# Patient Record
Sex: MALE | Race: WHITE | NOT HISPANIC OR LATINO | Employment: FULL TIME | URBAN - METROPOLITAN AREA
[De-identification: names, ages, dates, MRNs, and addresses within clinical notes are randomized per-mention and may not be internally consistent; named-entity substitution may affect disease eponyms.]

---

## 2018-08-30 ENCOUNTER — OFFICE VISIT (OUTPATIENT)
Dept: FAMILY MEDICINE | Facility: CLINIC | Age: 33
End: 2018-08-30
Payer: OTHER MISCELLANEOUS

## 2018-08-30 VITALS
WEIGHT: 266.9 LBS | RESPIRATION RATE: 14 BRPM | HEIGHT: 75 IN | OXYGEN SATURATION: 95 % | DIASTOLIC BLOOD PRESSURE: 104 MMHG | BODY MASS INDEX: 33.18 KG/M2 | TEMPERATURE: 98.5 F | SYSTOLIC BLOOD PRESSURE: 150 MMHG | HEART RATE: 87 BPM

## 2018-08-30 DIAGNOSIS — K42.9 UMBILICAL HERNIA WITHOUT OBSTRUCTION AND WITHOUT GANGRENE: Primary | ICD-10-CM

## 2018-08-30 DIAGNOSIS — R03.0 ELEVATED BLOOD PRESSURE READING WITHOUT DIAGNOSIS OF HYPERTENSION: ICD-10-CM

## 2018-08-30 PROCEDURE — 99203 OFFICE O/P NEW LOW 30 MIN: CPT | Performed by: PHYSICIAN ASSISTANT

## 2018-08-30 NOTE — MR AVS SNAPSHOT
After Visit Summary   8/30/2018    Nikolas Yo    MRN: [id number]           Patient Information     Date Of Birth          1985        Visit Information        Provider Department      8/30/2018 10:20 AM John Hayes PA-C [facility]        Today's Diagnoses     Umbilical hernia without obstruction and without gangrene    -  1    Elevated blood pressure reading without diagnosis of hypertension           Follow-ups after your visit        Additional Services     GENERAL SURG ADULT REFERRAL       Your provider has referred you to: FM[facility]: [facility] [phone]   [email or web address]    Please be aware that coverage of these services is subject to the terms and limitations of your health insurance plan.  Call member services at your health plan with any benefit or coverage questions.      Please bring the following with you to your appointment:    (1) Any X-Rays, CTs or MRIs which have been performed.  Contact the facility where they were done to arrange for  prior to your scheduled appointment.   (2) List of current medications   (3) This referral request   (4) Any documents/labs given to you for this referral                  Follow-up notes from your care team     Return in about 1 month (around 9/30/2018) for BP Recheck.      Who to contact     If you have questions or need follow up information about today's clinic visit or your schedule please contact [facility] directly at [phone].  Normal or non-critical lab and imaging results will be communicated to you by MyChart, letter or phone within 4 business days after the clinic has received the results. If you do not hear from us within 7 days, please contact the clinic through MyChart or phone. If you have a critical or abnormal lab result, we will notify you by phone as soon as possible.  Submit refill requests through  "MyChart or call your pharmacy and they will forward the refill request to us. Please allow 3 business days for your refill to be completed.          Additional Information About Your Visit        Care EveryWhere ID     This is your Care EveryWhere ID. This could be used by other organizations to access your [place] medical records  [id number]        Your Vitals Were     Pulse Temperature Respirations Height Pulse Oximetry BMI (Body Mass Index)    87 98.5  F (36.9  C) (Tympanic) 14 6' 2.5\" (1.892 m) 95% 33.81 kg/m2       Blood Pressure from Last 3 Encounters:   08/30/18 (!) 150/104    Weight from Last 3 Encounters:   08/30/18 266 lb 14.4 oz (121.1 kg)              We Performed the Following     GENERAL SURG ADULT REFERRAL        Primary Care Provider Office Phone # Fax #    [facility] [phone] [address] [facility] [address]        Equal Access to Services     LESLIE EDWARD AH: Hadii aad ku hadasho Soomaali, waaxda luqadaha, qaybta kaalmada adeegyada, waxay idiin hayaan belkys kharash lafabian . So [facility] [phone].    ATENCIÓN: Si habla español, tiene a gillis disposición servicios gratuitos de asistencia lingüística. Llame al [phone].    We comply with applicable federal civil rights laws and [place] laws. We do not discriminate on the basis of race, color, national origin, age, disability, sex, sexual orientation, or gender identity.            Thank you!     Thank you for choosing [facility]  for your care. Our goal is always to provide you with excellent care. Hearing back from our patients is one way we can continue to improve our services. Please take a few minutes to complete the written survey that you may receive in the mail after your visit with us. Thank you!             Your Updated Medication List - Protect others around you: Learn how to safely use, store and throw away your medicines at www.disposemymeds.org.      Notice  As of 8/30/2018 " 11:07 AM    You have not been prescribed any medications.

## 2018-08-30 NOTE — PROGRESS NOTES
SUBJECTIVE:   Nikolas Yo is a 33 year old male who presents to clinic today for the following health issues:    Work Comp - Hernia     Duration: July     Description (location/character/radiation): Patient was moving something at work and lifted something too heavy. Is now experiencing discomfort around belly button area, can sometimes feel protrusion.       Intensity:  moderate    Accompanying signs and symptoms: Intermittent protrusion, discomfort     History (similar episodes/previous evaluation): None    Precipitating or alleviating factors: Wearing a support brace is helpful     Therapies tried and outcome: Support brace around body, Acetaminophen - both are effective      -Patient is a 32yo male who presents with increased abdominal pain sustained in July at work  -He was trying to lift up a heavy cart when he felt some mid abdominal pain  -The sensation was warm, irritated; felt like a bug bit the next day  -Initially seemed to come and go, but is stable  -wearing a stablizing/compression brace      Problem list and histories reviewed & adjusted, as indicated.  Additional history: as documented    There is no problem list on file for this patient.    History reviewed. No pertinent surgical history.    Social History   Substance Use Topics     Smoking status: Current Some Day Smoker     Types: Other     Smokeless tobacco: Current User     Alcohol use Yes     Family History   Problem Relation Age of Onset     Alzheimer Disease Maternal Grandmother            Reviewed and updated as needed this visit by clinical staff  Tobacco  Allergies  Meds  Med Hx  Surg Hx  Fam Hx  Soc Hx      Reviewed and updated as needed this visit by Provider         ROS:  Constitutional, HEENT, cardiovascular, pulmonary, gi and gu systems are negative, except as otherwise noted.    OBJECTIVE:     BP (!) 140/102 (BP Location: Right arm, Patient Position: Chair, Cuff Size: Adult Large)  Pulse 87  Temp 98.5  F (36.9  C)  "(Tympanic)  Resp 14  Ht 6' 2.5\" (1.892 m)  Wt 266 lb 14.4 oz (121.1 kg)  SpO2 95%  BMI 33.81 kg/m2  Body mass index is 33.81 kg/(m^2).  GENERAL: healthy, alert and no distress  ABDOMEN: soft, nontender, bowel sounds normal and hernia: umbilical - increased with valsalva, tender to touch  MS: no gross musculoskeletal defects noted, no edema    Diagnostic Test Results:  none     ASSESSMENT/PLAN:   1. Umbilical hernia without obstruction and without gangrene  This is new and bothersome to patient and sustaining in July while at work. He feels the hernia daily but increased with any physical activity. It is not overly protuberant on exam and he does not have to reduce it often. We'll have him consult with surgery.  - GENERAL SURG ADULT REFERRAL    2. Elevated blood pressure reading without diagnosis of hypertension  Reviewed risks of poor control. He has a lot of risk modifications to work on including weight, possible WALLY, lack of activity and diet. Follow-up in 1 month for recheck.     John Hayes PA-C  [facility]  "

## 2018-08-31 ENCOUNTER — TELEPHONE (OUTPATIENT)
Dept: SURGERY | Facility: CLINIC | Age: 33
End: 2018-08-31

## 2018-08-31 NOTE — TELEPHONE ENCOUNTER
Referral received from John Hayes for umbilical hernia. Appt note states its WC, but no info in chart..    Attempt #1:    Called patient at [phone].  No answer - left message for patient to return call to clinic at [phone].

## 2018-09-18 ENCOUNTER — OFFICE VISIT (OUTPATIENT)
Dept: SURGERY | Facility: CLINIC | Age: 33
End: 2018-09-18
Payer: OTHER MISCELLANEOUS

## 2018-09-18 VITALS
RESPIRATION RATE: 16 BRPM | HEART RATE: 80 BPM | DIASTOLIC BLOOD PRESSURE: 88 MMHG | HEIGHT: 75 IN | BODY MASS INDEX: 32.95 KG/M2 | WEIGHT: 265 LBS | SYSTOLIC BLOOD PRESSURE: 136 MMHG | OXYGEN SATURATION: 97 %

## 2018-09-18 DIAGNOSIS — K42.9 UMBILICAL HERNIA WITHOUT OBSTRUCTION AND WITHOUT GANGRENE: Primary | ICD-10-CM

## 2018-09-18 PROCEDURE — 99204 OFFICE O/P NEW MOD 45 MIN: CPT | Performed by: SURGERY

## 2018-09-18 ASSESSMENT — ENCOUNTER SYMPTOMS: ABDOMINAL PAIN: 1

## 2018-09-18 NOTE — MR AVS SNAPSHOT
"              After Visit Summary   9/18/2018    Nikolas Yo    MRN: [id number]           Patient Information     Date Of Birth          1985        Visit Information        Provider Department      9/18/2018 10:30 AM Alida Garrison MD [facility] Hernia      Today's Diagnoses     Umbilical hernia without obstruction and without gangrene    -  1      Care Instructions    Patient to follow up with Primary Care provider regarding elevated blood pressure.    Ariana Mena CMA            Follow-ups after your visit        Who to contact     If you have questions or need follow up information about today's clinic visit or your schedule please contact [facility] directly at [phone].  Normal or non-critical lab and imaging results will be communicated to you by MyChart, letter or phone within 4 business days after the clinic has received the results. If you do not hear from us within 7 days, please contact the clinic through MyChart or phone. If you have a critical or abnormal lab result, we will notify you by phone as soon as possible.  Submit refill requests through [employer] or call your pharmacy and they will forward the refill request to us. Please allow 3 business days for your refill to be completed.          Additional Information About Your Visit        Care EveryWhere ID     This is your Care EveryWhere ID. This could be used by other organizations to access your [place] medical records  [id number]        Your Vitals Were     Pulse Respirations Height Pulse Oximetry BMI (Body Mass Index)       80 16 6' 2.5\" (1.892 m) 97% 33.57 kg/m2        Blood Pressure from Last 3 Encounters:   09/18/18 136/88   08/30/18 (!) 150/104    Weight from Last 3 Encounters:   09/18/18 265 lb (120.2 kg)   08/30/18 266 lb 14.4 oz (121.1 kg)              Today, you had the following     No orders found for display       Primary Care " Provider Office Phone # Fax #    John Hayes PA-C [phone]       [address] [facility] [address]        Equal Access to Services     LESLIE EDWARD : Hadii aad ku hadbustero Soomaali, waaxda luqadaha, qaybta kaalmada adeegyada, naila waltonn belkys sharma laMarryannabel michael. So [facility] [phone].    ATENCIÓN: Si habla español, tiene a gillis disposición servicios gratuitos de asistencia lingüística. Llame al [phone].    We comply with applicable federal civil rights laws and [place] laws. We do not discriminate on the basis of race, color, national origin, age, disability, sex, sexual orientation, or gender identity.            Thank you!     Thank you for choosing [facility]  for your care. Our goal is always to provide you with excellent care. Hearing back from our patients is one way we can continue to improve our services. Please take a few minutes to complete the written survey that you may receive in the mail after your visit with us. Thank you!             Your Updated Medication List - Protect others around you: Learn how to safely use, store and throw away your medicines at www.disposemymeds.org.      Notice  As of 9/18/2018 10:52 AM    You have not been prescribed any medications.

## 2018-09-18 NOTE — PATIENT INSTRUCTIONS
Patient to follow up with Primary Care provider regarding elevated blood pressure.    Ariana Mena CMA

## 2018-09-18 NOTE — LETTER
2018    Re: Nikolas Yo - 1985    Nikolas Yo is a 33 year old male is seen in consultation for a hernia, at the request of John Hayes PA-C.     Primary umbilical hernia noted after lifting at work.  WC claim submitted.     Plan:    We have discussed observation, reduction techniques and importance, incarceration and strangulation signs, symptoms and importance as well as need to seek emergency treatment.       We have discussed open umbilical hernia repair with mesh in detail, including benefits, alternatives, complications, incision, scar, mesh, infection, anesthesia, bleeding, blood transfusion, DVT, PE, hernia recurrence, lifting and activity limits after surgery.  All questions have been answered to the best of my ability.     He has been given literature to review.   We will schedule surgery when and if the patient elects.     Recommended time off work postop:  2-4 wks  Recommended time off lifting 20 lb:   3 wks, then increase by 10lb weekly for 6 weeks total.     I have encouraged Leydi to stop vaping 1 month prior to surgery as a means to reduce his risk of surgical complications including recurrence and infection.     I have given him a note for work to limit lifting at work to 15 pounds until the date of surgery.      HPI:  Nikolas Yo is a 33 year old male who presents for evaluation of a painful lump in the laquita-umbilical region.       Duration:  2 months  Pain/quality:  Yes / aching/stinging  Inciting event:  Yes - lifting a 50 gallon stock pot onto a cart at work  Exacerbating factors:  lifting at work.   Nausea/vomitting/bloating:  No    Previous surgery in this location:  No     Previous herniorrhaphy:  No      Constipation: Yes.has increased his fiber and water (2 gallons daily at work)  Cough: No  Diabetes: No  Current Smoker: Yes.  vaping daily.     Heavy lifting > 20 lb: Yes - at work.  Now only 15 lb.  Has others do the heavier lifting.      Past  "Medical History:  has no past medical history on file.      Family history reviewed and not pertinent.     ROS:  The 10 point review of systems is negative other than noted in the HPI and above.     PE:    Vitals: /88 (BP Location: Right arm, Cuff Size: Adult Large)  Pulse 80  Resp 16  Ht 6' 2.5\" (1.892 m)  Wt 265 lb (120.2 kg)  SpO2 97%  BMI 33.57 kg/m2  BMI= Body mass index is 33.57 kg/(m^2).  General - Well developed, well nourished male in no apparent distress  HEENT:  Head normocephalic and atraumatic, pupils equal and round, conjunctivae clear, no scleral icterus, mucous membranes moist, external ears and nose normal  Pulm:  Respirations unlabored  Abdomen:  abdomen is obese, soft without significant tenderness, masses, organomegaly or guarding  Hernia:  umbilical, 2 cm, not reduced due to pain, mildly tender  Extremities: Warm without edema  Musculoskeletal:  Normal station and gait  Neurologic: alert, speech is clear, moves all extremities with good strength  Psychiatric: Mood and affect appropriate  Skin: Without lesions or rashes, or karthik Garrison MD    "

## 2018-09-18 NOTE — PROGRESS NOTES
HPI      ROS (Review of Systems):     GASTROINTESTINAL: Positive for abdominal pain.          Physical Exam      Assessment:    Nikolas Yo is a 33 year old male is seen in consultation for a hernia, at the request of John Hayes PA-C.    Primary umbilical hernia noted after lifting at work.  WC claim submitted.    Plan:    We have discussed observation, reduction techniques and importance, incarceration and strangulation signs, symptoms and importance as well as need to seek emergency treatment.      We have discussed open umbilical hernia repair with mesh in detail, including benefits, alternatives, complications, incision, scar, mesh, infection, anesthesia, bleeding, blood transfusion, DVT, PE, hernia recurrence, lifting and activity limits after surgery.  All questions have been answered to the best of my ability.    He has been given literature to review.   We will schedule surgery when and if the patient elects.    Recommended time off work postop:  2-4 wks  Recommended time off lifting 20 lb:   3 wks, then increase by 10lb weekly for 6 weeks total.    I have encouraged Leydi to stop vaping 1 month prior to surgery as a means to reduce his risk of surgical complications including recurrence and infection.    I have given him a note for work to limit lifting at work to 15 pounds until the date of surgery.      HPI:  Nikolas Yo is a 33 year old male who presents for evaluation of a painful lump in the laquita-umbilical region.      Duration:  2 months  Pain/quality:  Yes / aching/stinging  Inciting event:  Yes - lifting a 50 gallon stock pot onto a cart at work  Exacerbating factors:  lifting at work.   Nausea/vomitting/bloating:  No    Previous surgery in this location:  No     Previous herniorrhaphy:  No     Constipation: Yes.has increased his fiber and water (2 gallons daily at work)  Cough: No  Diabetes: No  Current Smoker: Yes.  vaping daily.    Heavy lifting > 20 lb: Yes - at work.   "Now only 15 lb.  Has others do the heavier lifting.     Past Medical History:   has no past medical history on file.    Past Surgical History:  No past surgical history on file.     Social History:  Social History     Social History     Marital status: Single     Spouse name: N/A     Number of children: N/A     Years of education: N/A     Occupational History     Not on file.     Social History Main Topics     Smoking status: Current Some Day Smoker     Types: Other     Smokeless tobacco: Current User     Alcohol use Yes     Drug use: No     Sexual activity: Yes     Partners: Female     Other Topics Concern     Not on file     Social History Narrative        Family History:  Family History   Problem Relation Age of Onset     Alzheimer Disease Maternal Grandmother      Family history reviewed and not pertinent.    ROS:  The 10 point review of systems is negative other than noted in the HPI and above.    PE:    Vitals: /88 (BP Location: Right arm, Cuff Size: Adult Large)  Pulse 80  Resp 16  Ht 6' 2.5\" (1.892 m)  Wt 265 lb (120.2 kg)  SpO2 97%  BMI 33.57 kg/m2  BMI= Body mass index is 33.57 kg/(m^2).  General - Well developed, well nourished male in no apparent distress  HEENT:  Head normocephalic and atraumatic, pupils equal and round, conjunctivae clear, no scleral icterus, mucous membranes moist, external ears and nose normal  Pulm:  Respirations unlabored  Abdomen:  abdomen is obese, soft without significant tenderness, masses, organomegaly or guarding   Hernia:  umbilical, 2 cm, not reduced due to pain, mildly tender  Extremities: Warm without edema  Musculoskeletal:  Normal station and gait  Neurologic: alert, speech is clear, moves all extremities with good strength  Psychiatric: Mood and affect appropriate  Skin: Without lesions or rashes, or juandice    This note was created using voice recognition software. Undetected word substitutions or other errors may have occurred.     Time spent with the " patient with greater that 50% of the time in discussion was 25 minutes.     Alida Garrison MD    Please route or send letter to:  Primary Care Provider (PCP) and Referring Provider

## 2018-10-09 ENCOUNTER — TELEPHONE (OUTPATIENT)
Dept: SURGERY | Facility: CLINIC | Age: 33
End: 2018-10-09

## 2018-10-09 NOTE — TELEPHONE ENCOUNTER
Type of surgery: OPEN UMBILICAL HERNIA REPAIR WITH MESH   Location of surgery: [facility]  Date and time of surgery: 11/5/2018 @ 12:00 pm   Surgeon: DILIP WESTON MD    Pre-Op Appt Date: PATIENT TO SCHEDULE    Post-Op Appt Date: PATIENT TO SCHEDULE     Packet sent out: Yes  Pre-cert/Authorization completed:  Not Applicable  Date: 10/9/2018     OPEN UMBILICAL HERNIA REPAIR WITH MESH    GENERAL PT INST TO HAVE H&P WITH DR SANCHEZ 60 MIN REQ PA ASSIST EGG NMS

## 2018-10-17 RX ORDER — TAMSULOSIN HYDROCHLORIDE 0.4 MG/1
0.4 CAPSULE ORAL
Status: CANCELLED | OUTPATIENT
Start: 2018-10-17

## 2018-10-18 ENCOUNTER — OFFICE VISIT (OUTPATIENT)
Dept: FAMILY MEDICINE | Facility: CLINIC | Age: 33
End: 2018-10-18
Payer: OTHER MISCELLANEOUS

## 2018-10-18 VITALS
TEMPERATURE: 98.4 F | WEIGHT: 266.5 LBS | HEIGHT: 75 IN | RESPIRATION RATE: 12 BRPM | SYSTOLIC BLOOD PRESSURE: 130 MMHG | BODY MASS INDEX: 33.14 KG/M2 | OXYGEN SATURATION: 97 % | DIASTOLIC BLOOD PRESSURE: 102 MMHG | HEART RATE: 91 BPM

## 2018-10-18 DIAGNOSIS — I10 ESSENTIAL HYPERTENSION: ICD-10-CM

## 2018-10-18 DIAGNOSIS — K42.9 UMBILICAL HERNIA WITHOUT OBSTRUCTION AND WITHOUT GANGRENE: ICD-10-CM

## 2018-10-18 DIAGNOSIS — Z01.818 PREOP GENERAL PHYSICAL EXAM: Primary | ICD-10-CM

## 2018-10-18 LAB — HGB BLD-MCNC: 17.2 G/DL

## 2018-10-18 PROCEDURE — 80048 BASIC METABOLIC PNL TOTAL CA: CPT | Performed by: PHYSICIAN ASSISTANT

## 2018-10-18 PROCEDURE — 99215 OFFICE O/P EST HI 40 MIN: CPT | Performed by: PHYSICIAN ASSISTANT

## 2018-10-18 PROCEDURE — 85018 HEMOGLOBIN: CPT | Performed by: PHYSICIAN ASSISTANT

## 2018-10-18 PROCEDURE — 36415 COLL VENOUS BLD VENIPUNCTURE: CPT | Performed by: PHYSICIAN ASSISTANT

## 2018-10-18 NOTE — MR AVS SNAPSHOT
After Visit Summary   10/18/2018    Nikolas Yo    MRN: [id number]           Patient Information     Date Of Birth          1985        Visit Information        Provider Department      10/18/2018 1:00 PM John Hayes PA-C [facility]        Today's Diagnoses     Preop general physical exam    -  1    Umbilical hernia without obstruction and without gangrene        Essential hypertension          Care Instructions      Before Your Surgery      Call your surgeon if there is any change in your health. This includes signs of a cold or flu (such as a sore throat, runny nose, cough, rash or fever).    Do not smoke, drink alcohol or take over the counter medicine (unless your surgeon or primary care doctor tells you to) for the 24 hours before and after surgery.    If you take prescribed drugs: Follow your doctor s orders about which medicines to take and which to stop until after surgery.    Eating and drinking prior to surgery: follow the instructions from your surgeon    Take a shower or bath the night before surgery. Use the soap your surgeon gave you to gently clean your skin. If you do not have soap from your surgeon, use your regular soap. Do not shave or scrub the surgery site.  Wear clean pajamas and have clean sheets on your bed.           Follow-ups after your visit        Follow-up notes from your care team     Return in about 3 months (around 1/18/2019) for BP Recheck.      Your next 10 appointments already scheduled     Nov 05, 2018   Procedure with Alida Garrsion MD   [facility] Services (--)    [address] [facility] [address]   [phone]            Nov 05, 2018 12:00 PM [facility] Same Day Surgery with Alida Garrison MD, Leigha Varghese PA-C   [facility] ([facility])    [facility] ([facility])   [phone]     "          Who to contact     If you have questions or need follow up information about today's clinic visit or your schedule please contact [facility] directly at [phone].  Normal or non-critical lab and imaging results will be communicated to you by MyChart, letter or phone within 4 business days after the clinic has received the results. If you do not hear from us within 7 days, please contact the clinic through MyChart or phone. If you have a critical or abnormal lab result, we will notify you by phone as soon as possible.  Submit refill requests through [employer] or call your pharmacy and they will forward the refill request to us. Please allow 3 business days for your refill to be completed.          Additional Information About Your Visit        [employer] Information     [employer] lets you send messages to your doctor, view your test results, renew your prescriptions, schedule appointments and more. To sign up, go to [email or web address] . Click on \"Log in\" on the left side of the screen, which will take you to the Welcome page. Then click on \"Sign up Now\" on the right side of the page.     You will be asked to enter the access code listed below, as well as some personal information. Please follow the directions to create your username and password.     Your access code is: [email or web address]  Expires: 2019 12:51 PM     Your access code will  in 90 days. If you need help or a new code, please call your [facility] clinic or [phone].        Care EveryWhere ID     This is your Care EveryWhere ID. This could be used by other organizations to access your [facility] medical records  [id number]        Your Vitals Were     Pulse Temperature Respirations Height Pulse Oximetry BMI (Body Mass Index)    91 98.4  F (36.9  C) (Tympanic) 12 6' 2.5\" (1.892 m) 97% 33.76 kg/m2       Blood Pressure from Last 3 Encounters:   10/18/18 (!) 130/102   18 136/88   18 (!) 150/104    Weight from " Last 3 Encounters:   10/18/18 266 lb 8 oz (120.9 kg)   09/18/18 265 lb (120.2 kg)   08/30/18 266 lb 14.4 oz (121.1 kg)              We Performed the Following     Basic metabolic panel     Hemoglobin        Primary Care Provider Office Phone # Fax #    John Hayes PA-C [phone]       [address] [facility] [address]        Equal Access to Services     [facility]: Hadii aad ku hadasho Soomaali, waaxda luqadaha, qaybta kaalmada adeegyada, waxay idiin hayaan adeeg kharash la'aan . So [facility] [phone].    ATENCIÓN: Si habla español, tiene a gillis disposición servicios gratuitos de asistencia lingüística. Llame al [phone].    We comply with applicable federal civil rights laws and [place] laws. We do not discriminate on the basis of race, color, national origin, age, disability, sex, sexual orientation, or gender identity.            Thank you!     Thank you for choosing [facility]  for your care. Our goal is always to provide you with excellent care. Hearing back from our patients is one way we can continue to improve our services. Please take a few minutes to complete the written survey that you may receive in the mail after your visit with us. Thank you!             Your Updated Medication List - Protect others around you: Learn how to safely use, store and throw away your medicines at www.disposemymeds.org.      Notice  As of 10/18/2018  1:29 PM    You have not been prescribed any medications.

## 2018-10-18 NOTE — PROGRESS NOTES
[facility]  [address] [facility] [id number] [phone]  Dept: [phone]    PRE-OP EVALUATION:  Today's date: 10/18/2018    Nikolas Yo (: 1985) presents for pre-operative evaluation assessment as requested by Dr. Garrison. He requires evaluation and anesthesia risk assessment prior to undergoing surgery/procedure for treatment of Umbilical Hernia.    Primary Physician: John Hayes  Type of Anesthesia Anticipated: General    Patient has a Health Care Directive or Living Will:  NO    Preop Questions 10/18/2018   1.  Do you have a history of Heart attack, stroke, stent, coronary bypass surgery, or other heart surgery? No   2.  Do you ever have any pain or discomfort in your chest? UNKNOWN - stomach   3.  Do you have a history of  Heart Failure? No   4.   Are you troubled by shortness of breath when:  walking on a level surface, or up a slight hill, or at night? No   5.  Do you currently have a cold, bronchitis or other respiratory infection? No   6.  Do you have a cough, shortness of breath, or wheezing? YES - occasional cough   7.  Do you sometimes get pains in the calves of your legs when you walk? No   8. Do you or anyone in your family have previous history of blood clots? No   9.  Do you or does anyone in your family have a serious bleeding problem such as prolonged bleeding following surgeries or cuts? No   10. Have you ever had problems with anemia or been told to take iron pills? No   11. Have you had any abnormal blood loss such as black, tarry or bloody stools? No   12. Have you ever had a blood transfusion? No   13. Have you or any of your relatives ever had problems with anesthesia? No   14. Do you have sleep apnea, excessive snoring or daytime drowsiness? YES - does snore excessively   15. Do you have any prosthetic heart valves? No   16. Do you have prosthetic joints? No         HPI:     HPI related to upcoming procedure: Patient lifting a heavy  "item from one cart and placing onto another and noted immediate pain in the stomach. Symptoms persistent since then      See problem list for active medical problems.  Problems all longstanding and stable, except as noted/documented.  See ROS for pertinent symptoms related to these conditions.                                                                                                                                                          .    MEDICAL HISTORY:     Patient Active Problem List    Diagnosis Date Noted     Essential hypertension 10/18/2018     Priority: Medium      History reviewed. No pertinent past medical history.  History reviewed. No pertinent surgical history.  No current outpatient prescriptions on file.     OTC products: None, except as noted above    No Known Allergies   Latex Allergy: NO    Social History   Substance Use Topics     Smoking status: Current Some Day Smoker     Types: Other     Smokeless tobacco: Current User     Alcohol use Yes     History   Drug Use No       REVIEW OF SYSTEMS:   Constitutional, neuro, ENT, endocrine, pulmonary, cardiac, gastrointestinal, genitourinary, musculoskeletal, integument and psychiatric systems are negative, except as otherwise noted.    EXAM:   BP (!) 130/102  Pulse 91  Temp 98.4  F (36.9  C) (Tympanic)  Resp 12  Ht 6' 2.5\" (1.892 m)  Wt 266 lb 8 oz (120.9 kg)  SpO2 97%  BMI 33.76 kg/m2    GENERAL APPEARANCE: healthy, alert and no distress     EYES: EOMI,  PERRL     HENT: ear canals and TM's normal and nose and mouth without ulcers or lesions     NECK: no adenopathy, no asymmetry, masses, or scars and thyroid normal to palpation     RESP: lungs clear to auscultation - no rales, rhonchi or wheezes     CV: regular rates and rhythm, normal S1 S2, no S3 or S4 and no murmur, click or rub     ABDOMEN: there is a small tender bulge in the umbilicus     MS: extremities normal- no gross deformities noted, no evidence of inflammation in joints, FROM " in all extremities.     SKIN: no suspicious lesions or rashes    DIAGNOSTICS:   Hemoglobin: 17.2  Serum Potassium: 4.3  Serum Creatinine: 0.78    IMPRESSION:   Reason for surgery/procedure: Umbilical hernia  Diagnosis/reason for consult: pre-operative consult    The proposed surgical procedure is considered INTERMEDIATE risk.    REVISED CARDIAC RISK INDEX  The patient has the following serious cardiovascular risks for perioperative complications such as (MI, PE, VFib and 3  AV Block):  No serious cardiac risks  INTERPRETATION: 0 risks: Class I (very low risk - 0.4% complication rate)    The patient has the following additional risks for perioperative complications:  No identified additional risks      ICD-10-CM    1. Preop general physical exam Z01.818 Basic metabolic panel     Hemoglobin   2. Umbilical hernia without obstruction and without gangrene K42.9 Basic metabolic panel     Hemoglobin   3. Essential hypertension I10 Basic metabolic panel; Extensive discussion regarding BP control, risks of poor control and strategies. He will work on lifestyle changes initially but will follow up in no later than 3 months for recheck.       RECOMMENDATIONS:     APPROVAL GIVEN to proceed with proposed procedure, without further diagnostic evaluation       Signed Electronically by: John Hayes PA-C    Copy of this evaluation report is provided to requesting physician.    [place] Preop Guidelines    Revised Cardiac Risk Index

## 2018-10-19 LAB
ANION GAP SERPL CALCULATED.3IONS-SCNC: 10 MMOL/L (ref 3–14)
BUN SERPL-MCNC: 12 MG/DL (ref 7–30)
CALCIUM SERPL-MCNC: 9.3 MG/DL (ref 8.5–10.1)
CHLORIDE SERPL-SCNC: 107 MMOL/L (ref 94–109)
CO2 SERPL-SCNC: 23 MMOL/L (ref 20–32)
CREAT SERPL-MCNC: 0.78 MG/DL (ref 0.66–1.25)
GFR SERPL CREATININE-BSD FRML MDRD: >90 ML/MIN/1.7M2
GLUCOSE SERPL-MCNC: 100 MG/DL (ref 70–99)
POTASSIUM SERPL-SCNC: 4.3 MMOL/L (ref 3.4–5.3)
SODIUM SERPL-SCNC: 140 MMOL/L (ref 133–144)

## 2018-11-02 NOTE — H&P (VIEW-ONLY)
[facility]  [address] [id number] [phone]  Dept: [phone]    PRE-OP EVALUATION:  Today's date: 10/18/2018    Nikolas Yo (: 1985) presents for pre-operative evaluation assessment as requested by Dr. Garrison. He requires evaluation and anesthesia risk assessment prior to undergoing surgery/procedure for treatment of Umbilical Hernia.    Primary Physician: John Hayes  Type of Anesthesia Anticipated: General    Patient has a Health Care Directive or Living Will:  NO    Preop Questions 10/18/2018   1.  Do you have a history of Heart attack, stroke, stent, coronary bypass surgery, or other heart surgery? No   2.  Do you ever have any pain or discomfort in your chest? UNKNOWN - stomach   3.  Do you have a history of  Heart Failure? No   4.   Are you troubled by shortness of breath when:  walking on a level surface, or up a slight hill, or at night? No   5.  Do you currently have a cold, bronchitis or other respiratory infection? No   6.  Do you have a cough, shortness of breath, or wheezing? YES - occasional cough   7.  Do you sometimes get pains in the calves of your legs when you walk? No   8. Do you or anyone in your family have previous history of blood clots? No   9.  Do you or does anyone in your family have a serious bleeding problem such as prolonged bleeding following surgeries or cuts? No   10. Have you ever had problems with anemia or been told to take iron pills? No   11. Have you had any abnormal blood loss such as black, tarry or bloody stools? No   12. Have you ever had a blood transfusion? No   13. Have you or any of your relatives ever had problems with anesthesia? No   14. Do you have sleep apnea, excessive snoring or daytime drowsiness? YES - does snore excessively   15. Do you have any prosthetic heart valves? No   16. Do you have prosthetic joints? No         HPI:     HPI related to upcoming procedure: Patient lifting a heavy  "item from one cart and placing onto another and noted immediate pain in the stomach. Symptoms persistent since then      See problem list for active medical problems.  Problems all longstanding and stable, except as noted/documented.  See ROS for pertinent symptoms related to these conditions.                                                                                                                                                          .    MEDICAL HISTORY:     Patient Active Problem List    Diagnosis Date Noted     Essential hypertension 10/18/2018     Priority: Medium      History reviewed. No pertinent past medical history.  History reviewed. No pertinent surgical history.  No current outpatient prescriptions on file.     OTC products: None, except as noted above    No Known Allergies   Latex Allergy: NO    Social History   Substance Use Topics     Smoking status: Current Some Day Smoker     Types: Other     Smokeless tobacco: Current User     Alcohol use Yes     History   Drug Use No       REVIEW OF SYSTEMS:   Constitutional, neuro, ENT, endocrine, pulmonary, cardiac, gastrointestinal, genitourinary, musculoskeletal, integument and psychiatric systems are negative, except as otherwise noted.    EXAM:   BP (!) 130/102  Pulse 91  Temp 98.4  F (36.9  C) (Tympanic)  Resp 12  Ht 6' 2.5\" (1.892 m)  Wt 266 lb 8 oz (120.9 kg)  SpO2 97%  BMI 33.76 kg/m2    GENERAL APPEARANCE: healthy, alert and no distress     EYES: EOMI,  PERRL     HENT: ear canals and TM's normal and nose and mouth without ulcers or lesions     NECK: no adenopathy, no asymmetry, masses, or scars and thyroid normal to palpation     RESP: lungs clear to auscultation - no rales, rhonchi or wheezes     CV: regular rates and rhythm, normal S1 S2, no S3 or S4 and no murmur, click or rub     ABDOMEN: there is a small tender bulge in the umbilicus     MS: extremities normal- no gross deformities noted, no evidence of inflammation in joints, FROM " in all extremities.     SKIN: no suspicious lesions or rashes    DIAGNOSTICS:   Hemoglobin: 17.2  Serum Potassium: 4.3  Serum Creatinine: 0.78    IMPRESSION:   Reason for surgery/procedure: Umbilical hernia  Diagnosis/reason for consult: pre-operative consult    The proposed surgical procedure is considered INTERMEDIATE risk.    REVISED CARDIAC RISK INDEX  The patient has the following serious cardiovascular risks for perioperative complications such as (MI, PE, VFib and 3  AV Block):  No serious cardiac risks  INTERPRETATION: 0 risks: Class I (very low risk - 0.4% complication rate)    The patient has the following additional risks for perioperative complications:  No identified additional risks      ICD-10-CM    1. Preop general physical exam Z01.818 Basic metabolic panel     Hemoglobin   2. Umbilical hernia without obstruction and without gangrene K42.9 Basic metabolic panel     Hemoglobin   3. Essential hypertension I10 Basic metabolic panel; Extensive discussion regarding BP control, risks of poor control and strategies. He will work on lifestyle changes initially but will follow up in no later than 3 months for recheck.       RECOMMENDATIONS:     APPROVAL GIVEN to proceed with proposed procedure, without further diagnostic evaluation       Signed Electronically by: John Hayes PA-C    Copy of this evaluation report is provided to requesting physician.    [place] Preop Guidelines    Revised Cardiac Risk Index

## 2018-11-05 ENCOUNTER — HOSPITAL ENCOUNTER (OUTPATIENT)
Facility: CLINIC | Age: 33
Discharge: HOME | End: 2018-11-05
Attending: SURGERY | Admitting: SURGERY
Payer: OTHER MISCELLANEOUS

## 2018-11-05 ENCOUNTER — ANESTHESIA EVENT (OUTPATIENT)
Dept: SURGERY | Facility: CLINIC | Age: 33
End: 2018-11-05
Payer: OTHER MISCELLANEOUS

## 2018-11-05 ENCOUNTER — ANESTHESIA (OUTPATIENT)
Dept: SURGERY | Facility: CLINIC | Age: 33
End: 2018-11-05
Payer: OTHER MISCELLANEOUS

## 2018-11-05 ENCOUNTER — OFFICE VISIT (OUTPATIENT)
Dept: SURGERY | Facility: PHYSICIAN GROUP | Age: 33
End: 2018-11-05
Payer: OTHER MISCELLANEOUS

## 2018-11-05 ENCOUNTER — SURGERY (OUTPATIENT)
Age: 33
End: 2018-11-05

## 2018-11-05 VITALS
DIASTOLIC BLOOD PRESSURE: 99 MMHG | WEIGHT: 262 LBS | OXYGEN SATURATION: 95 % | TEMPERATURE: 98.5 F | RESPIRATION RATE: 18 BRPM | BODY MASS INDEX: 33.62 KG/M2 | HEIGHT: 74 IN | SYSTOLIC BLOOD PRESSURE: 143 MMHG

## 2018-11-05 DIAGNOSIS — Z53.9 ERRONEOUS ENCOUNTER--DISREGARD: Primary | ICD-10-CM

## 2018-11-05 DIAGNOSIS — K42.9 UMBILICAL HERNIA WITHOUT OBSTRUCTION AND WITHOUT GANGRENE: Primary | ICD-10-CM

## 2018-11-05 PROCEDURE — 25000128 H RX IP 250 OP 636: Performed by: ANESTHESIOLOGY

## 2018-11-05 PROCEDURE — 49585 ZZHC REPAIR UMBILICAL HERN,5+Y/O,REDUC: CPT | Mod: AS | Performed by: PHYSICIAN ASSISTANT

## 2018-11-05 PROCEDURE — 25000566 ZZH SEVOFLURANE, EA 15 MIN: Performed by: SURGERY

## 2018-11-05 PROCEDURE — 25000132 ZZH RX MED GY IP 250 OP 250 PS 637: Performed by: SURGERY

## 2018-11-05 PROCEDURE — 71000012 ZZH RECOVERY PHASE 1 LEVEL 1 FIRST HR: Performed by: SURGERY

## 2018-11-05 PROCEDURE — 49585 ZZHC REPAIR UMBILICAL HERN,5+Y/O,REDUC: CPT | Performed by: SURGERY

## 2018-11-05 PROCEDURE — 36000050 ZZH SURGERY LEVEL 2 1ST 30 MIN: Performed by: SURGERY

## 2018-11-05 PROCEDURE — 40000306 ZZH STATISTIC PRE PROC ASSESS II: Performed by: SURGERY

## 2018-11-05 PROCEDURE — 71000027 ZZH RECOVERY PHASE 2 EACH 15 MINS: Performed by: SURGERY

## 2018-11-05 PROCEDURE — 25000125 ZZHC RX 250: Performed by: NURSE ANESTHETIST, CERTIFIED REGISTERED

## 2018-11-05 PROCEDURE — 25000128 H RX IP 250 OP 636: Performed by: SURGERY

## 2018-11-05 PROCEDURE — 27210794 ZZH OR GENERAL SUPPLY STERILE: Performed by: SURGERY

## 2018-11-05 PROCEDURE — 25000128 H RX IP 250 OP 636: Performed by: NURSE ANESTHETIST, CERTIFIED REGISTERED

## 2018-11-05 PROCEDURE — C1781 MESH (IMPLANTABLE): HCPCS | Performed by: SURGERY

## 2018-11-05 PROCEDURE — 27211024 ZZHC OR SUPPLY OTHER OPNP: Performed by: SURGERY

## 2018-11-05 PROCEDURE — 37000009 ZZH ANESTHESIA TECHNICAL FEE, EACH ADDTL 15 MIN: Performed by: SURGERY

## 2018-11-05 PROCEDURE — 37000008 ZZH ANESTHESIA TECHNICAL FEE, 1ST 30 MIN: Performed by: SURGERY

## 2018-11-05 PROCEDURE — 36000052 ZZH SURGERY LEVEL 2 EA 15 ADDTL MIN: Performed by: SURGERY

## 2018-11-05 DEVICE — MESH VENTRALEX HERNIA 2.5" CIRCLE MED W/STRAP 5950008: Type: IMPLANTABLE DEVICE | Site: ABDOMEN | Status: FUNCTIONAL

## 2018-11-05 RX ORDER — ONDANSETRON 2 MG/ML
INJECTION INTRAMUSCULAR; INTRAVENOUS PRN
Status: DISCONTINUED | OUTPATIENT
Start: 2018-11-05 | End: 2018-11-05

## 2018-11-05 RX ORDER — IBUPROFEN 800 MG/1
800 TABLET, FILM COATED ORAL EVERY 8 HOURS PRN
Qty: 90 TABLET | Refills: 0 | COMMUNITY
Start: 2018-11-05 | End: 2021-01-05

## 2018-11-05 RX ORDER — HYDROCODONE BITARTRATE AND ACETAMINOPHEN 5; 325 MG/1; MG/1
1 TABLET ORAL
Status: COMPLETED | OUTPATIENT
Start: 2018-11-05 | End: 2018-11-05

## 2018-11-05 RX ORDER — FENTANYL CITRATE 50 UG/ML
25-50 INJECTION, SOLUTION INTRAMUSCULAR; INTRAVENOUS
Status: CANCELLED | OUTPATIENT
Start: 2018-11-05

## 2018-11-05 RX ORDER — CEFAZOLIN SODIUM 1 G/3ML
1 INJECTION, POWDER, FOR SOLUTION INTRAMUSCULAR; INTRAVENOUS SEE ADMIN INSTRUCTIONS
Status: DISCONTINUED | OUTPATIENT
Start: 2018-11-05 | End: 2018-11-05 | Stop reason: HOSPADM

## 2018-11-05 RX ORDER — FENTANYL CITRATE 50 UG/ML
25-50 INJECTION, SOLUTION INTRAMUSCULAR; INTRAVENOUS
Status: DISCONTINUED | OUTPATIENT
Start: 2018-11-05 | End: 2018-11-05 | Stop reason: HOSPADM

## 2018-11-05 RX ORDER — MEPERIDINE HYDROCHLORIDE 25 MG/ML
12.5 INJECTION INTRAMUSCULAR; INTRAVENOUS; SUBCUTANEOUS
Status: CANCELLED | OUTPATIENT
Start: 2018-11-05

## 2018-11-05 RX ORDER — AMOXICILLIN 250 MG
1-2 CAPSULE ORAL 2 TIMES DAILY
Qty: 30 TABLET | Refills: 0 | Status: SHIPPED | OUTPATIENT
Start: 2018-11-05 | End: 2019-08-12

## 2018-11-05 RX ORDER — SODIUM CHLORIDE, SODIUM LACTATE, POTASSIUM CHLORIDE, CALCIUM CHLORIDE 600; 310; 30; 20 MG/100ML; MG/100ML; MG/100ML; MG/100ML
INJECTION, SOLUTION INTRAVENOUS CONTINUOUS
Status: DISCONTINUED | OUTPATIENT
Start: 2018-11-05 | End: 2018-11-05 | Stop reason: HOSPADM

## 2018-11-05 RX ORDER — HYDROMORPHONE HYDROCHLORIDE 1 MG/ML
.3-.5 INJECTION, SOLUTION INTRAMUSCULAR; INTRAVENOUS; SUBCUTANEOUS EVERY 10 MIN PRN
Status: CANCELLED | OUTPATIENT
Start: 2018-11-05

## 2018-11-05 RX ORDER — IBUPROFEN 600 MG/1
600 TABLET, FILM COATED ORAL
Status: DISCONTINUED | OUTPATIENT
Start: 2018-11-05 | End: 2018-11-05 | Stop reason: HOSPADM

## 2018-11-05 RX ORDER — CEFAZOLIN SODIUM IN 0.9 % NACL 3 G/100 ML
3 INTRAVENOUS SOLUTION, PIGGYBACK (ML) INTRAVENOUS
Status: COMPLETED | OUTPATIENT
Start: 2018-11-05 | End: 2018-11-05

## 2018-11-05 RX ORDER — LABETALOL HYDROCHLORIDE 5 MG/ML
10 INJECTION, SOLUTION INTRAVENOUS
Status: DISCONTINUED | OUTPATIENT
Start: 2018-11-05 | End: 2018-11-05 | Stop reason: HOSPADM

## 2018-11-05 RX ORDER — NALOXONE HYDROCHLORIDE 0.4 MG/ML
.1-.4 INJECTION, SOLUTION INTRAMUSCULAR; INTRAVENOUS; SUBCUTANEOUS
Status: CANCELLED | OUTPATIENT
Start: 2018-11-05 | End: 2018-11-06

## 2018-11-05 RX ORDER — ONDANSETRON 2 MG/ML
4 INJECTION INTRAMUSCULAR; INTRAVENOUS EVERY 30 MIN PRN
Status: DISCONTINUED | OUTPATIENT
Start: 2018-11-05 | End: 2018-11-05 | Stop reason: HOSPADM

## 2018-11-05 RX ORDER — DEXAMETHASONE SODIUM PHOSPHATE 4 MG/ML
INJECTION, SOLUTION INTRA-ARTICULAR; INTRALESIONAL; INTRAMUSCULAR; INTRAVENOUS; SOFT TISSUE PRN
Status: DISCONTINUED | OUTPATIENT
Start: 2018-11-05 | End: 2018-11-05

## 2018-11-05 RX ORDER — HYDROMORPHONE HYDROCHLORIDE 1 MG/ML
.3-.5 INJECTION, SOLUTION INTRAMUSCULAR; INTRAVENOUS; SUBCUTANEOUS EVERY 10 MIN PRN
Status: DISCONTINUED | OUTPATIENT
Start: 2018-11-05 | End: 2018-11-05 | Stop reason: HOSPADM

## 2018-11-05 RX ORDER — ONDANSETRON 4 MG/1
4 TABLET, ORALLY DISINTEGRATING ORAL EVERY 30 MIN PRN
Status: DISCONTINUED | OUTPATIENT
Start: 2018-11-05 | End: 2018-11-05 | Stop reason: HOSPADM

## 2018-11-05 RX ORDER — FENTANYL CITRATE 50 UG/ML
INJECTION, SOLUTION INTRAMUSCULAR; INTRAVENOUS PRN
Status: DISCONTINUED | OUTPATIENT
Start: 2018-11-05 | End: 2018-11-05

## 2018-11-05 RX ORDER — ONDANSETRON 2 MG/ML
4 INJECTION INTRAMUSCULAR; INTRAVENOUS EVERY 30 MIN PRN
Status: CANCELLED | OUTPATIENT
Start: 2018-11-05

## 2018-11-05 RX ORDER — MEPERIDINE HYDROCHLORIDE 50 MG/ML
12.5 INJECTION INTRAMUSCULAR; INTRAVENOUS; SUBCUTANEOUS
Status: DISCONTINUED | OUTPATIENT
Start: 2018-11-05 | End: 2018-11-05 | Stop reason: HOSPADM

## 2018-11-05 RX ORDER — ONDANSETRON 4 MG/1
4 TABLET, ORALLY DISINTEGRATING ORAL EVERY 30 MIN PRN
Status: CANCELLED | OUTPATIENT
Start: 2018-11-05

## 2018-11-05 RX ORDER — NALOXONE HYDROCHLORIDE 0.4 MG/ML
.1-.4 INJECTION, SOLUTION INTRAMUSCULAR; INTRAVENOUS; SUBCUTANEOUS
Status: DISCONTINUED | OUTPATIENT
Start: 2018-11-05 | End: 2018-11-05 | Stop reason: HOSPADM

## 2018-11-05 RX ORDER — GLYCINE 1.5 G/100ML
SOLUTION IRRIGATION PRN
Status: DISCONTINUED | OUTPATIENT
Start: 2018-11-05 | End: 2018-11-05 | Stop reason: HOSPADM

## 2018-11-05 RX ORDER — BUPIVACAINE HYDROCHLORIDE 2.5 MG/ML
INJECTION, SOLUTION EPIDURAL; INFILTRATION; INTRACAUDAL PRN
Status: DISCONTINUED | OUTPATIENT
Start: 2018-11-05 | End: 2018-11-05 | Stop reason: HOSPADM

## 2018-11-05 RX ORDER — LIDOCAINE HYDROCHLORIDE 10 MG/ML
INJECTION, SOLUTION INFILTRATION; PERINEURAL PRN
Status: DISCONTINUED | OUTPATIENT
Start: 2018-11-05 | End: 2018-11-05

## 2018-11-05 RX ORDER — SODIUM CHLORIDE, SODIUM LACTATE, POTASSIUM CHLORIDE, CALCIUM CHLORIDE 600; 310; 30; 20 MG/100ML; MG/100ML; MG/100ML; MG/100ML
INJECTION, SOLUTION INTRAVENOUS CONTINUOUS
Status: CANCELLED | OUTPATIENT
Start: 2018-11-05

## 2018-11-05 RX ORDER — LIDOCAINE 40 MG/G
CREAM TOPICAL
Status: DISCONTINUED | OUTPATIENT
Start: 2018-11-05 | End: 2018-11-05 | Stop reason: HOSPADM

## 2018-11-05 RX ORDER — HYDROCODONE BITARTRATE AND ACETAMINOPHEN 5; 325 MG/1; MG/1
1-2 TABLET ORAL EVERY 4 HOURS PRN
Qty: 15 TABLET | Refills: 0 | Status: SHIPPED | OUTPATIENT
Start: 2018-11-05 | End: 2018-11-28

## 2018-11-05 RX ORDER — PROPOFOL 10 MG/ML
INJECTION, EMULSION INTRAVENOUS PRN
Status: DISCONTINUED | OUTPATIENT
Start: 2018-11-05 | End: 2018-11-05

## 2018-11-05 RX ADMIN — MIDAZOLAM 2 MG: 1 INJECTION INTRAMUSCULAR; INTRAVENOUS at 08:51

## 2018-11-05 RX ADMIN — BUPIVACAINE HYDROCHLORIDE 20 ML: 2.5 INJECTION, SOLUTION EPIDURAL; INFILTRATION; INTRACAUDAL; PERINEURAL at 09:27

## 2018-11-05 RX ADMIN — FENTANYL CITRATE 100 MCG: 50 INJECTION, SOLUTION INTRAMUSCULAR; INTRAVENOUS at 08:56

## 2018-11-05 RX ADMIN — ONDANSETRON 4 MG: 2 INJECTION INTRAMUSCULAR; INTRAVENOUS at 09:23

## 2018-11-05 RX ADMIN — Medication 3 G: at 08:49

## 2018-11-05 RX ADMIN — SODIUM CHLORIDE, POTASSIUM CHLORIDE, SODIUM LACTATE AND CALCIUM CHLORIDE: 600; 310; 30; 20 INJECTION, SOLUTION INTRAVENOUS at 08:49

## 2018-11-05 RX ADMIN — FENTANYL CITRATE 50 MCG: 50 INJECTION, SOLUTION INTRAMUSCULAR; INTRAVENOUS at 09:50

## 2018-11-05 RX ADMIN — HYDROCODONE BITARTRATE AND ACETAMINOPHEN 1 TABLET: 5; 325 TABLET ORAL at 10:19

## 2018-11-05 RX ADMIN — LIDOCAINE HYDROCHLORIDE 30 MG: 10 INJECTION, SOLUTION INFILTRATION; PERINEURAL at 08:56

## 2018-11-05 RX ADMIN — DEXAMETHASONE SODIUM PHOSPHATE 4 MG: 4 INJECTION, SOLUTION INTRA-ARTICULAR; INTRALESIONAL; INTRAMUSCULAR; INTRAVENOUS; SOFT TISSUE at 08:56

## 2018-11-05 RX ADMIN — GLYCINE 30 ML: 1.5 SOLUTION IRRIGATION at 09:27

## 2018-11-05 RX ADMIN — PROPOFOL 300 MG: 10 INJECTION, EMULSION INTRAVENOUS at 08:56

## 2018-11-05 RX ADMIN — FENTANYL CITRATE 50 MCG: 50 INJECTION, SOLUTION INTRAMUSCULAR; INTRAVENOUS at 09:09

## 2018-11-05 RX ADMIN — MEPERIDINE HYDROCHLORIDE 12.5 MG: 50 INJECTION INTRAMUSCULAR; INTRAVENOUS; SUBCUTANEOUS at 09:48

## 2018-11-05 NOTE — ANESTHESIA PREPROCEDURE EVALUATION
PAC NOTE:       ANESTHESIA PRE EVALUATION:  Anesthesia Evaluation     . Pt has had prior anesthetic. Type: General    No history of anesthetic complications          ROS/MED HX    ENT/Pulmonary:  - neg pulmonary ROS     Neurologic:  - neg neurologic ROS     Cardiovascular:     (+) hypertension----. : . . . :. .       METS/Exercise Tolerance:     Hematologic: Comments: Lab Test        10/18/18                       1330          HGB          17.2           Lab Test        10/18/18                       1330          NA           140           POTASSIUM    4.3           CHLORIDE     107           CO2          23            BUN          12            CR           0.78          ANIONGAP     10            LAWRENCE          9.3           GLC          100*                  Musculoskeletal:  - neg musculoskeletal ROS       GI/Hepatic:  - neg GI/hepatic ROS       Renal/Genitourinary:  - ROS Renal section negative       Endo:  - neg endo ROS       Psychiatric:  - neg psychiatric ROS       Infectious Disease:  - neg infectious disease ROS       Malignancy:         Other:    - neg other ROS                 Physical Exam  Normal systems: cardiovascular, pulmonary and dental    Airway   Mallampati: II  TM distance: >3 FB  Neck ROM: full    Dental     Cardiovascular       Pulmonary              Anesthesia Plan      History & Physical Review  History and physical reviewed and following examination; no interval change.    ASA Status:  2 .    NPO Status:  > 8 hours    Plan for General and LMA with Intravenous induction. Maintenance will be Balanced.    PONV prophylaxis:  Ondansetron (or other 5HT-3) and Dexamethasone or Solumedrol       Postoperative Care  Postoperative pain management:  IV analgesics and Oral pain medications.      Consents  Anesthetic plan, risks, benefits and alternatives discussed with:  Patient..                            .

## 2018-11-05 NOTE — IP AVS SNAPSHOT
MRN:[id number]                      After Visit Summary   11/5/2018    Nikolas Yo    MRN: [id number]           Thank you!     Thank you for choosing [facility] for your care. Our goal is always to provide you with excellent care. Hearing back from our patients is one way we can continue to improve our services. Please take a few minutes to complete the written survey that you may receive in the mail after you visit. If you would like to speak to someone directly about your visit please contact Patient Relations at [phone]. Thank you!          Patient Information     Date Of Birth          1985        Designated Caregiver       Most Recent Value    Caregiver    Will someone help with your care after discharge? yes    Name of designated caregiver Ynes    Phone number of caregiver home    Caregiver address home      About your hospital stay     You were admitted on:  November 5, 2018 You last received care in the:  [facility] PreOP/PostOP    You were discharged on:  November 5, 2018       Who to Call     For medical emergencies, please call 911.  For non-urgent questions about your medical care, please call your primary care provider or clinic, [phone]  For questions related to your surgery, please call your surgery clinic        Attending Provider     Provider Specialty    Alida Garrison MD Surgery       Primary Care Provider Office Phone # Fax #    John Hayes PA-C [phone]      Further instructions from your care team       HOME CARE FOLLOWING UMBILICAL/VENTRAL HERNIA REPAIR  DENISHA Baker, AKHIL Lei R. O Donnell, MORE Varela    DIET:  No restrictions.  Increased fluid intake is recommended. While taking pain medications, increase dietary fiber or add a fiber supplementation like Metamucil or Citrucel to help prevent constipation - a possible side effect of pain medications.    NAUSEA:  If  "nauseated from the anesthetic/pain meds; rest in bed, get up cautiously with assistance, and drink clear liquids (juice, tea, broth).    ACTIVITY:  Light Activity -- you may immediately be up and about as tolerated.  Driving -- you may drive when comfortable and off narcotic pain medications.  Light Work -- resume when comfortable off pain medications.  (If you can drive, you probably can work.)  Strenuous Work/Activity -- limit lifting to 20 pounds for 3 weeks.  Active Sports (running, biking, etc.) -- cautiously resume after 4 weeks.    INCISIONAL CARE:    If you have a dressing in place, keep clean and dry for 48 hours after surgery.  After this timeframe, you may replace the gauze daily if it becomes soiled.    You may remove the dressing and shower 48 hours after surgery.  Do not submerse incision in water for 1 week.    If you have a Dermabond dressing (a type of skin glue), you may shower immediately.    Sutures will absorb and need not be removed.    If present, leave the steri-strips (white paper tapes) in place for 14 days after surgery.    If present, leave Dermabond glue in place until it wears/flakes off.    Expect a variable amount of swelling/bruising/discoloration that may appear around or below the repair site.    Some numbness around the incision is common.    A lump/\"healing ridge\" under the incision is normal and will gradually resolve over the following 1-2 months.    DISCOMFORT:  Local anesthetic placed at surgery should provide relief for 4-8 hours.  Begin taking pain pills before discomfort is severe.  Take the pain medication with some food, when possible, to minimize side effects.  Intermittent use of ice packs to the hernia repair site may help during the first 1-3 weeks after surgery.  Expect gradual improvement.    Over-the-counter anti-inflammatory medications (i.e. Ibuprofen/Advil/Motrin or Naprosyn/Aleve) may be used per package instructions in addition to or while tapering off the " narcotic pain medications to decrease swelling and sensitivity at the repair site.  DO NOT TAKE these Anti-inflammatory medications if your primary physician has advised against doing so, or if you have acid reflux, ulcer, or bleeding disorder, or take blood-thinner medications.  Call your primary physician or the surgery office if you have medication questions.      RETURN APPOINTMENT:  Schedule a follow-up visit 2-3 weeks post-op.  Office Phone:  [phone]     CONTACT US IF THE FOLLOWING DEVELOPS:   1. A fever that is above 101     2. If there is a large amount of drainage, bleeding, or swelling.   3. Severe pain that is not relieved by your prescription.   4. Drainage that is thick, cloudy, yellow, green or white.   5. Any other questions not answered by  Frequently Asked Questions  sheet.      FREQUENTLY ASKED QUESTIONS:    Q:  How should my incision look?    A:  Normally your incision will appear slightly swollen with light redness directly along the incision itself as it heals.  It may feel like a bump or ridge as the healing/scarring happens, and over time (3-4 months) this bump or ridge feeling should slowly go away.  In general, clear or pink watery drainage can be normal at first as your incision heals, but should decrease over time.    Q:  How do I know if my incision is infected?  A:  Look at your incision for signs of infection, like redness around the incision spreading to surrounding skin, or drainage of cloudy or foul-smelling drainage.  If you feel warm, check your temperature to see if you are running a fever.    **If any of these things occur, please notify the nurse at our office.  We may need you to come into the office for an incision check.      Q:  How do I take care of my incision?  A:  If you have a dressing in place - Starting the day after surgery, replace the dressing 1-2 times a day until there is no further drainage from the incision.  At that time, a dressing is no longer needed.   Try to minimize tape on the skin if irritation is occurring at the tape sites.  If you have significant irritation from tape on the skin, please call the office to discuss other method of dressing your incision.    Small pieces of tape called  steri-strips  may be present directly overlying your incision; these may be removed 10 days after surgery unless otherwise specified by your surgeon.  If these tapes start to loosen at the ends, you may trim them back until they fall off or are removed.    A:  If you had  Dermabond  tissue glue used as a dressing (this causes your incision to look shiny with a clear covering over it) - This type of dressing wears off with time and does not require more dressings over the top unless it is draining around the glue as it wears off.  Do not apply ointments or lotions over the incisions until the glue has completely worn off.    Q:  There is a piece of tape or a sticky  lead  still on my skin.  Can I remove this?  A:  Sometimes the sticky  leads  used for monitoring during surgery or for evaluation in the emergency department are not all removed while you are in the hospital.  These sometimes have a tab or metal dot on them.  You can easily remove these on your own, like taking off a band-aid.  If there is a gel substance under the  lead , simply wipe/clean it off with a washcloth or paper towel.      Q:  What can I do to minimize constipation (very hard stools, or lack of stools)?  A:  Stay well hydrated.  Increase your dietary fiber intake or take a fiber supplement -with plenty of water.  Walk around frequently.  You may consider an over-the-counter stool-softener.  Your Pharmacist can assist you with choosing one that is stocked at your pharmacy.  Constipation is also one of the most common side effects of pain medication.  If you are using pain medication, be pro-active and try to PREVENT problems with constipation by taking the steps above BEFORE constipation becomes a  problem.    Q:  What do I do if I need more pain medications?  A:  Call the office to receive refills.  Be aware that certain pain meds cannot be called into a pharmacy and actually require a paper prescription.  A change may be made in your pain med as you progress thru your recovery period or if you have side effects to certain meds.    --Pain meds are NOT refilled after 5pm on weekdays, and NOT AT ALL on the weekends, so please look ahead to prevent problems.      Q:  Why am I having a hard time sleeping now that I am at home?  A:  Many medications you receive while you are in the hospital can impact your sleep for a number of days after your surgery/hospitalization.  Decreased level of activity and naps during the day may also make sleeping at night difficult.  Try to minimize day-time naps, and get up frequently during the day to walk around your home during your recovery time.  Sleep aides may be of some help, but are not recommended for long-term use.      Q:  I am having some back discomfort.  What should I do?  A:  This may be related to certain positioning that was required for your surgery, extended periods of time in bed, or other changes in your overall activity level.  You may try ice, heat, acetaminophen, or ibuprofen to treat this temporarily.  Note that many pain medications have acetaminophen in them and would state this on the prescription bottle.  Be sure not to exceed the maximum of 4000mg per day of acetaminophen.     **If the pain you are having does not resolve, is severe, or is a flare of back pain you have had on other occasions prior to surgery, please contact your primary physician for further recommendations or for an appointment to be examined at their office.    Q:  Why am I having headaches?  A:  Headaches can be caused by many things:  caffeine withdrawal, use of pain meds, dehydration, high blood pressure, lack of sleep, over-activity/exhaustion, flare-up of usual migraine  headaches.  If you feel this is related to muscle tension (a band-like feeling around the head, or a pressure at the low-back of the head) you may try ice or heat to this area.  You may need to drink more fluids (try electrolyte drink like Gatorade), rest, or take your usual migraine medications.   **If your headaches do not resolve, worsen, are accompanied by other symptoms, or if your blood pressure is high, please call your primary physician for recommendation and/or examination.    Q:  I am unable to urinate.  What do I do?  A:  A small percentage of people can have difficulty urinating initially after surgery.  This includes being able to urinate only a very small amount at a time and feeling discomfort or pressure in the very low abdomen.  This is called  urinary retention , and is actually an urgent situation.  Proceed to your nearest Emergency department for evaluation (not an Urgent Care Center).  Sometimes the bladder does not work correctly after certain medications you receive during surgery, or related to certain procedures.  You may need to have a catheter placed until your bladder recovers.  When planning to go to an Emergency department, it may help to call the ER to let them know you are coming in for this problem after a surgery.  This may help you get in quicker to be evaluated.  **If you have symptoms of a urinary tract infection, please contact your primary physician for the proper evaluation and treatment.    If you have other questions, please call the office Monday thru Friday between 8am and 5pm to discuss with the nurse or physician assistant.  #[phone]    There is a surgeon ON CALL on weekday evenings and over the weekend in case of urgent need only, and may be contacted at the same number.    If you are having an emergency, call 911 or proceed to your nearest emergency department.        GENERAL ANESTHESIA OR SEDATION ADULT DISCHARGE INSTRUCTIONS   SPECIAL PRECAUTIONS FOR 24 HOURS  "AFTER SURGERY    IT IS NOT UNUSUAL TO FEEL LIGHT-HEADED OR FAINT, UP TO 24 HOURS AFTER SURGERY OR WHILE TAKING PAIN MEDICATION.  IF YOU HAVE THESE SYMPTOMS; SIT FOR A FEW MINUTES BEFORE STANDING AND HAVE SOMEONE ASSIST YOU WHEN YOU GET UP TO WALK OR USE THE BATHROOM.    YOU SHOULD REST AND RELAX FOR THE NEXT 24 HOURS AND YOU MUST MAKE ARRANGEMENTS TO HAVE SOMEONE STAY WITH YOU FOR AT LEAST 24 HOURS AFTER YOUR DISCHARGE.  AVOID HAZARDOUS AND STRENUOUS ACTIVITIES.  DO NOT MAKE IMPORTANT DECISIONS FOR 24 HOURS.    DO NOT DRIVE ANY VEHICLE OR OPERATE MECHANICAL EQUIPMENT FOR 24 HOURS FOLLOWING THE END OF YOUR SURGERY.  EVEN THOUGH YOU MAY FEEL NORMAL, YOUR REACTIONS MAY BE AFFECTED BY THE MEDICATION YOU HAVE RECEIVED.    DO NOT DRINK ALCOHOLIC BEVERAGES FOR 24 HOURS FOLLOWING YOUR SURGERY.    DRINK CLEAR LIQUIDS (APPLE JUICE, GINGER ALE, 7-UP, BROTH, ETC.).  PROGRESS TO YOUR REGULAR DIET AS YOU FEEL ABLE.    YOU MAY HAVE A DRY MOUTH, A SORE THROAT, MUSCLES ACHES OR TROUBLE SLEEPING.  THESE SHOULD GO AWAY AFTER 24 HOURS.    CALL YOUR DOCTOR FOR ANY OF THE FOLLOWING:  SIGNS OF INFECTION (FEVER, GROWING TENDERNESS AT THE SURGERY SITE, A LARGE AMOUNT OF DRAINAGE OR BLEEDING, SEVERE PAIN, FOUL-SMELLING DRAINAGE, REDNESS OR SWELLING.    IT HAS BEEN OVER 8 TO 10 HOURS SINCE SURGERY AND YOU ARE STILL NOT ABLE TO URINATE (PASS WATER).       1 Norco given at 10:19 am        Pending Results     No orders found from 11/3/2018 to 11/6/2018.            Admission Information     Date & Time Provider Department Dept. Phone    11/5/2018 Alida Garrison MD [facility] PreOP/PostOP [phone]      Your Vitals Were     Blood Pressure Temperature Respirations Height Weight Pulse Oximetry    148/97 98.5  F (36.9  C) 19 1.892 m (6' 2.49\") 118.8 kg (262 lb) 98%    BMI (Body Mass Index)                   33.2 kg/m2           MyChart Information     [employer] lets you send messages to your doctor, view your test results, renew your " "prescriptions, schedule appointments and more. To sign up, go to [email or web address] . Click on \"Log in\" on the left side of the screen, which will take you to the Welcome page. Then click on \"Sign up Now\" on the right side of the page.     You will be asked to enter the access code listed below, as well as some personal information. Please follow the directions to create your username and password.     Your access code is: [email or web address]  Expires: 2019 11:51 AM     Your access code will  in 90 days. If you need help or a new code, please call your [facility] clinic or [phone].        Care EveryWhere ID     This is your Care EveryWhere ID. This could be used by other organizations to access your [facility] medical records  [id number]        Equal Access to Services     LESLIE EDWARD : Cesar Hoffman, brayan caba, judy reaves, naila fontenot . So [facility] [phone].    ATENCIÓN: Si habla español, tiene a gillis disposición servicios gratuitos de asistencia lingüística. Llame al [phone].    We comply with applicable federal civil rights laws and [place] laws. We do not discriminate on the basis of race, color, national origin, age, disability, sex, sexual orientation, or gender identity.               Review of your medicines      START taking        Dose / Directions    HYDROcodone-acetaminophen 5-325 MG per tablet   Commonly known as:  NORCO   Used for:  Umbilical hernia without obstruction and without gangrene        Dose:  1-2 tablet   Take 1-2 tablets by mouth every 4 hours as needed for moderate to severe pain   Quantity:  15 tablet   Refills:  0       ibuprofen 800 MG tablet   Commonly known as:  ADVIL/MOTRIN   Used for:  Umbilical hernia without obstruction and without gangrene        Dose:  800 mg   Take 1 tablet (800 mg) by mouth every 8 hours as needed for moderate pain   Quantity:  90 tablet   Refills:  0       senna-docusate " 8.6-50 MG per tablet   Commonly known as:  SENOKOT-S;PERICOLACE   Used for:  Umbilical hernia without obstruction and without gangrene        Dose:  1-2 tablet   Take 1-2 tablets by mouth 2 times daily   Quantity:  30 tablet   Refills:  0            Where to get your medicines      These medications were sent to [place] - [address] [facility]  [address] [facility] [address]     Phone:  [phone]     senna-docusate 8.6-50 MG per tablet         Some of these will need a paper prescription and others can be bought over the counter. Ask your nurse if you have questions.     Bring a paper prescription for each of these medications     HYDROcodone-acetaminophen 5-325 MG per tablet       You don't need a prescription for these medications     ibuprofen 800 MG tablet                Protect others around you: Learn how to safely use, store and throw away your medicines at www.disposemymeds.org.        Information about OPIOIDS     PRESCRIPTION OPIOIDS: WHAT YOU NEED TO KNOW   We gave you an opioid (narcotic) pain medicine. It is important to manage your pain, but opioids are not always the best choice. You should first try all the other options your care team gave you. Take this medicine for as short a time (and as few doses) as possible.    Some activities can increase your pain, such as bandage changes or therapy sessions. It may help to take your pain medicine 30 to 60 minutes before these activities. Reduce your stress by getting enough sleep, working on hobbies you enjoy and practicing relaxation or meditation. Talk to your care team about ways to manage your pain beyond prescription opioids.    These medicines have risks:    DO NOT drive when on new or higher doses of pain medicine. These medicines can affect your alertness and reaction times, and you could be arrested for driving under the influence (DUI). If you need to use opioids long-term, talk to your care  team about driving.    DO NOT operate heavy machinery    DO NOT do any other dangerous activities while taking these medicines.    DO NOT drink any alcohol while taking these medicines.     If the opioid prescribed includes acetaminophen, DO NOT take with any other medicines that contain acetaminophen. Read all labels carefully. Look for the word  acetaminophen  or  Tylenol.  Ask your pharmacist if you have questions or are unsure.    You can get addicted to pain medicines, especially if you have a history of addiction (chemical, alcohol or substance dependence). Talk to your care team about ways to reduce this risk.    All opioids tend to cause constipation. Drink plenty of water and eat foods that have a lot of fiber, such as fruits, vegetables, prune juice, apple juice and high-fiber cereal. Take a laxative (Miralax, milk of magnesia, Colace, Senna) if you don t move your bowels at least every other day. Other side effects include upset stomach, sleepiness, dizziness, throwing up, tolerance (needing more of the medicine to have the same effect), physical dependence and slowed breathing.    Store your pills in a secure place, locked if possible. We will not replace any lost or stolen medicine. If you don t finish your medicine, please throw away (dispose) as directed by your pharmacist. The [place] [employer]ution [employer] has more information about safe disposal: https://www.pca.[facility].[place].us/living-green/managing-unwanted-medications             Medication List: This is a list of all your medications and when to take them. Check marks below indicate your daily home schedule. Keep this list as a reference.      Medications           Morning Afternoon Evening Bedtime As Needed    HYDROcodone-acetaminophen 5-325 MG per tablet   Commonly known as:  NORCO   Take 1-2 tablets by mouth every 4 hours as needed for moderate to severe pain   Last time this was given:  1 tablet on 11/5/2018 10:19 AM                                 ibuprofen 800 MG tablet   Commonly known as:  ADVIL/MOTRIN   Take 1 tablet (800 mg) by mouth every 8 hours as needed for moderate pain                                senna-docusate 8.6-50 MG per tablet   Commonly known as:  SENOKOT-S;PERICOLACE   Take 1-2 tablets by mouth 2 times daily

## 2018-11-05 NOTE — DISCHARGE INSTRUCTIONS
"HOME CARE FOLLOWING UMBILICAL/VENTRAL HERNIA REPAIR  DENISHA Baker, AKHIL Lei, TAMY Sepulveda, MORE Varela    DIET:  No restrictions.  Increased fluid intake is recommended. While taking pain medications, increase dietary fiber or add a fiber supplementation like Metamucil or Citrucel to help prevent constipation - a possible side effect of pain medications.    NAUSEA:  If nauseated from the anesthetic/pain meds; rest in bed, get up cautiously with assistance, and drink clear liquids (juice, tea, broth).    ACTIVITY:  Light Activity -- you may immediately be up and about as tolerated.  Driving -- you may drive when comfortable and off narcotic pain medications.  Light Work -- resume when comfortable off pain medications.  (If you can drive, you probably can work.)  Strenuous Work/Activity -- limit lifting to 20 pounds for 3 weeks.  Active Sports (running, biking, etc.) -- cautiously resume after 4 weeks.    INCISIONAL CARE:    If you have a dressing in place, keep clean and dry for 48 hours after surgery.  After this timeframe, you may replace the gauze daily if it becomes soiled.    You may remove the dressing and shower 48 hours after surgery.  Do not submerse incision in water for 1 week.    If you have a Dermabond dressing (a type of skin glue), you may shower immediately.    Sutures will absorb and need not be removed.    If present, leave the steri-strips (white paper tapes) in place for 14 days after surgery.    If present, leave Dermabond glue in place until it wears/flakes off.    Expect a variable amount of swelling/bruising/discoloration that may appear around or below the repair site.    Some numbness around the incision is common.    A lump/\"healing ridge\" under the incision is normal and will gradually resolve over the following 1-2 months.    DISCOMFORT:  Local anesthetic placed at surgery should provide relief for 4-8 hours.  Begin taking pain pills before discomfort is severe. "  Take the pain medication with some food, when possible, to minimize side effects.  Intermittent use of ice packs to the hernia repair site may help during the first 1-3 weeks after surgery.  Expect gradual improvement.    Over-the-counter anti-inflammatory medications (i.e. Ibuprofen/Advil/Motrin or Naprosyn/Aleve) may be used per package instructions in addition to or while tapering off the narcotic pain medications to decrease swelling and sensitivity at the repair site.  DO NOT TAKE these Anti-inflammatory medications if your primary physician has advised against doing so, or if you have acid reflux, ulcer, or bleeding disorder, or take blood-thinner medications.  Call your primary physician or the surgery office if you have medication questions.      RETURN APPOINTMENT:  Schedule a follow-up visit 2-3 weeks post-op.  Office Phone:  [phone]     CONTACT US IF THE FOLLOWING DEVELOPS:   1. A fever that is above 101     2. If there is a large amount of drainage, bleeding, or swelling.   3. Severe pain that is not relieved by your prescription.   4. Drainage that is thick, cloudy, yellow, green or white.   5. Any other questions not answered by  Frequently Asked Questions  sheet.      FREQUENTLY ASKED QUESTIONS:    Q:  How should my incision look?    A:  Normally your incision will appear slightly swollen with light redness directly along the incision itself as it heals.  It may feel like a bump or ridge as the healing/scarring happens, and over time (3-4 months) this bump or ridge feeling should slowly go away.  In general, clear or pink watery drainage can be normal at first as your incision heals, but should decrease over time.    Q:  How do I know if my incision is infected?  A:  Look at your incision for signs of infection, like redness around the incision spreading to surrounding skin, or drainage of cloudy or foul-smelling drainage.  If you feel warm, check your temperature to see if you are running a  fever.    **If any of these things occur, please notify the nurse at our office.  We may need you to come into the office for an incision check.      Q:  How do I take care of my incision?  A:  If you have a dressing in place - Starting the day after surgery, replace the dressing 1-2 times a day until there is no further drainage from the incision.  At that time, a dressing is no longer needed.  Try to minimize tape on the skin if irritation is occurring at the tape sites.  If you have significant irritation from tape on the skin, please call the office to discuss other method of dressing your incision.    Small pieces of tape called  steri-strips  may be present directly overlying your incision; these may be removed 10 days after surgery unless otherwise specified by your surgeon.  If these tapes start to loosen at the ends, you may trim them back until they fall off or are removed.    A:  If you had  Dermabond  tissue glue used as a dressing (this causes your incision to look shiny with a clear covering over it) - This type of dressing wears off with time and does not require more dressings over the top unless it is draining around the glue as it wears off.  Do not apply ointments or lotions over the incisions until the glue has completely worn off.    Q:  There is a piece of tape or a sticky  lead  still on my skin.  Can I remove this?  A:  Sometimes the sticky  leads  used for monitoring during surgery or for evaluation in the emergency department are not all removed while you are in the hospital.  These sometimes have a tab or metal dot on them.  You can easily remove these on your own, like taking off a band-aid.  If there is a gel substance under the  lead , simply wipe/clean it off with a washcloth or paper towel.      Q:  What can I do to minimize constipation (very hard stools, or lack of stools)?  A:  Stay well hydrated.  Increase your dietary fiber intake or take a fiber supplement -with plenty of water.   Walk around frequently.  You may consider an over-the-counter stool-softener.  Your Pharmacist can assist you with choosing one that is stocked at your pharmacy.  Constipation is also one of the most common side effects of pain medication.  If you are using pain medication, be pro-active and try to PREVENT problems with constipation by taking the steps above BEFORE constipation becomes a problem.    Q:  What do I do if I need more pain medications?  A:  Call the office to receive refills.  Be aware that certain pain meds cannot be called into a pharmacy and actually require a paper prescription.  A change may be made in your pain med as you progress thru your recovery period or if you have side effects to certain meds.    --Pain meds are NOT refilled after 5pm on weekdays, and NOT AT ALL on the weekends, so please look ahead to prevent problems.      Q:  Why am I having a hard time sleeping now that I am at home?  A:  Many medications you receive while you are in the hospital can impact your sleep for a number of days after your surgery/hospitalization.  Decreased level of activity and naps during the day may also make sleeping at night difficult.  Try to minimize day-time naps, and get up frequently during the day to walk around your home during your recovery time.  Sleep aides may be of some help, but are not recommended for long-term use.      Q:  I am having some back discomfort.  What should I do?  A:  This may be related to certain positioning that was required for your surgery, extended periods of time in bed, or other changes in your overall activity level.  You may try ice, heat, acetaminophen, or ibuprofen to treat this temporarily.  Note that many pain medications have acetaminophen in them and would state this on the prescription bottle.  Be sure not to exceed the maximum of 4000mg per day of acetaminophen.     **If the pain you are having does not resolve, is severe, or is a flare of back pain you have  had on other occasions prior to surgery, please contact your primary physician for further recommendations or for an appointment to be examined at their office.    Q:  Why am I having headaches?  A:  Headaches can be caused by many things:  caffeine withdrawal, use of pain meds, dehydration, high blood pressure, lack of sleep, over-activity/exhaustion, flare-up of usual migraine headaches.  If you feel this is related to muscle tension (a band-like feeling around the head, or a pressure at the low-back of the head) you may try ice or heat to this area.  You may need to drink more fluids (try electrolyte drink like Gatorade), rest, or take your usual migraine medications.   **If your headaches do not resolve, worsen, are accompanied by other symptoms, or if your blood pressure is high, please call your primary physician for recommendation and/or examination.    Q:  I am unable to urinate.  What do I do?  A:  A small percentage of people can have difficulty urinating initially after surgery.  This includes being able to urinate only a very small amount at a time and feeling discomfort or pressure in the very low abdomen.  This is called  urinary retention , and is actually an urgent situation.  Proceed to your nearest Emergency department for evaluation (not an Urgent Care Center).  Sometimes the bladder does not work correctly after certain medications you receive during surgery, or related to certain procedures.  You may need to have a catheter placed until your bladder recovers.  When planning to go to an Emergency department, it may help to call the ER to let them know you are coming in for this problem after a surgery.  This may help you get in quicker to be evaluated.  **If you have symptoms of a urinary tract infection, please contact your primary physician for the proper evaluation and treatment.    If you have other questions, please call the office Monday thru Friday between 8am and 5pm to discuss with the  nurse or physician assistant.  #[phone]    There is a surgeon ON CALL on weekday evenings and over the weekend in case of urgent need only, and may be contacted at the same number.    If you are having an emergency, call 911 or proceed to your nearest emergency department.        GENERAL ANESTHESIA OR SEDATION ADULT DISCHARGE INSTRUCTIONS   SPECIAL PRECAUTIONS FOR 24 HOURS AFTER SURGERY    IT IS NOT UNUSUAL TO FEEL LIGHT-HEADED OR FAINT, UP TO 24 HOURS AFTER SURGERY OR WHILE TAKING PAIN MEDICATION.  IF YOU HAVE THESE SYMPTOMS; SIT FOR A FEW MINUTES BEFORE STANDING AND HAVE SOMEONE ASSIST YOU WHEN YOU GET UP TO WALK OR USE THE BATHROOM.    YOU SHOULD REST AND RELAX FOR THE NEXT 24 HOURS AND YOU MUST MAKE ARRANGEMENTS TO HAVE SOMEONE STAY WITH YOU FOR AT LEAST 24 HOURS AFTER YOUR DISCHARGE.  AVOID HAZARDOUS AND STRENUOUS ACTIVITIES.  DO NOT MAKE IMPORTANT DECISIONS FOR 24 HOURS.    DO NOT DRIVE ANY VEHICLE OR OPERATE MECHANICAL EQUIPMENT FOR 24 HOURS FOLLOWING THE END OF YOUR SURGERY.  EVEN THOUGH YOU MAY FEEL NORMAL, YOUR REACTIONS MAY BE AFFECTED BY THE MEDICATION YOU HAVE RECEIVED.    DO NOT DRINK ALCOHOLIC BEVERAGES FOR 24 HOURS FOLLOWING YOUR SURGERY.    DRINK CLEAR LIQUIDS (APPLE JUICE, GINGER ALE, 7-UP, BROTH, ETC.).  PROGRESS TO YOUR REGULAR DIET AS YOU FEEL ABLE.    YOU MAY HAVE A DRY MOUTH, A SORE THROAT, MUSCLES ACHES OR TROUBLE SLEEPING.  THESE SHOULD GO AWAY AFTER 24 HOURS.    CALL YOUR DOCTOR FOR ANY OF THE FOLLOWING:  SIGNS OF INFECTION (FEVER, GROWING TENDERNESS AT THE SURGERY SITE, A LARGE AMOUNT OF DRAINAGE OR BLEEDING, SEVERE PAIN, FOUL-SMELLING DRAINAGE, REDNESS OR SWELLING.    IT HAS BEEN OVER 8 TO 10 HOURS SINCE SURGERY AND YOU ARE STILL NOT ABLE TO URINATE (PASS WATER).       1 Norco given at 10:19 am

## 2018-11-05 NOTE — IP AVS SNAPSHOT
[facility] PreOP/PostOP    [address]    [facility] [address]    Phone:  [phone]    Fax:  [phone]                                       After Visit Summary   11/5/2018    Nikolas Yo    MRN: [id number]           After Visit Summary Signature Page     I have received my discharge instructions, and my questions have been answered. I have discussed any challenges I see with this plan with the nurse or doctor.    ..........................................................................................................................................  Patient/Patient Representative Signature      ..........................................................................................................................................  Patient Representative Print Name and Relationship to Patient    ..................................................               ................................................  Date                                   Time    ..........................................................................................................................................  Reviewed by Signature/Title    ...................................................              ..............................................  Date                                               Time          22EPIC Rev 08/18

## 2018-11-05 NOTE — ANESTHESIA CARE TRANSFER NOTE
Patient: Nikolas Yo    Procedure(s):  open umbilical hernia repair with mesh    Diagnosis:  umbilical hernia  Diagnosis Additional Information: No value filed.    Anesthesia Type:   General, LMA     Note:  Airway :Face Mask  Patient transferred to:PACU  Comments: VSS.Handoff Report: Identifed the Patient, Identified the Reponsible Provider, Reviewed the pertinent medical history, Discussed the surgical course, Reviewed Intra-OP anesthesia mangement and issues during anesthesia, Set expectations for post-procedure period and Allowed opportunity for questions and acknowledgement of understanding      Vitals: (Last set prior to Anesthesia Care Transfer)    CRNA VITALS  11/5/2018 0904 - 11/5/2018 0940      11/5/2018             Pulse: 107    SpO2: 99 %    Resp Rate (observed): 14                Electronically Signed By: GEOFF Stein CRNA  November 5, 2018  9:40 AM

## 2018-11-06 NOTE — ANESTHESIA POSTPROCEDURE EVALUATION
Patient: Nikolas Yo    Procedure(s):  open umbilical hernia repair with mesh    Diagnosis: umbilical hernia  Diagnosis Additional Information: Pre-operative diagnosis: Umbilical hernia  Post-operative diagnosis: Same   Procedure: Umbilical Herniorrhaphy with Bard Ventralex ST Hernia Patch (6.4 cm round)         Anesthesia Type:  General, LMA    Note:  Anesthesia Post Evaluation    Patient location during evaluation: PACU  Patient participation: Able to fully participate in evaluation  Level of consciousness: awake and alert  Pain management: adequate  Airway patency: patent  Cardiovascular status: acceptable  Respiratory status: acceptable  Hydration status: acceptable  PONV: none     Anesthetic complications: None          Last vitals:  Vitals:    11/05/18 1025 11/05/18 1102 11/05/18 1141   BP: (!) 148/97 (!) 150/101 (!) 143/99   Resp: 19 20 18   Temp:      SpO2: 98% 93% 95%         Electronically Signed By: Rashad Delarosa MD  November 6, 2018  7:31 AM

## 2018-11-28 ENCOUNTER — OFFICE VISIT (OUTPATIENT)
Dept: SURGERY | Facility: CLINIC | Age: 33
End: 2018-11-28
Payer: OTHER MISCELLANEOUS

## 2018-11-28 VITALS — HEART RATE: 88 BPM | OXYGEN SATURATION: 95 % | DIASTOLIC BLOOD PRESSURE: 80 MMHG | SYSTOLIC BLOOD PRESSURE: 118 MMHG

## 2018-11-28 DIAGNOSIS — Z09 SURGICAL FOLLOWUP VISIT: Primary | ICD-10-CM

## 2018-11-28 PROCEDURE — 99024 POSTOP FOLLOW-UP VISIT: CPT | Performed by: PHYSICIAN ASSISTANT

## 2018-11-28 NOTE — LETTER
2018    RE: Nikolas Yo, : 1985      [facility] Note   Subjective:  Nikolas Yo is here for his first postoperative visit.  He underwent Umbilical hernia repair with Bard Ventralex ST Hernia Patch by Dr. Garrison.  He is now over 3 weeks postop, and feels his recovery has been progressing nicely.  Rx/OTC pain medication has been used appropriately for pain control.  Postop recovery complications: None.     Today he has minimal discomfort at the repair site with activities, tolerating a regular diet, and having normal bowel activity.  Current pain management: none.  His normal work is a  position involving moderate activity.  He returned to work a week after surgery at limited hours and abiding by restrictions; felt he overdid it a bit initially but now is tolerating well.  Following restrictions outlined by surgeon.     Objective:  Abd - soft, non-tender, non-distended  Inc - skin glue removed, healing well, no erythema/bruising, +normal healing ridge, no seroma/hematoma noted, no hernia noted     Assessment:  S/p Umbilical hernia repair with Bard Ventralex ST Hernia Patch; unremarkable recovery.     Plan:  Nikolas may continue to slowly advance his activities at this time.  General recommendation is to remain at a 20 lb weight restriction until 3 weeks after surgery.  After that time, he may increase activity as tolerated.  He may continue to utilize OTC pain management options as well as use of ice/heat to site for comfort.  He should expect progressive resolution of the healing ridge along the incisional site over the following 2-3 months.       Nikolas is recommended to contact the office if worsening pain, onset of fever/redness at inc site, or new drainage from the area.  Pt also recommended to call office at any time if ongoing questions/concerns during recovery, but otherwise may follow-up on a prn basis.  Pt is in agreement with this plan.        Randa PARKER  ALEXIA Chung

## 2018-11-28 NOTE — MR AVS SNAPSHOT
"              After Visit Summary   2018    Nikolas Yo    MRN: [id number]           Patient Information     Date Of Birth          1985        Visit Information        Provider Department      2018 11:00 AM Randa Chung PA-C [facility] Hernia      Today's Diagnoses     Surgical followup visit    -  1       Follow-ups after your visit        Follow-up notes from your care team     Return if symptoms worsen or fail to improve.      Who to contact     If you have questions or need follow up information about today's clinic visit or your schedule please contact [facility] directly at [phone].  Normal or non-critical lab and imaging results will be communicated to you by MyChart, letter or phone within 4 business days after the clinic has received the results. If you do not hear from us within 7 days, please contact the clinic through MyChart or phone. If you have a critical or abnormal lab result, we will notify you by phone as soon as possible.  Submit refill requests through [employer] or call your pharmacy and they will forward the refill request to us. Please allow 3 business days for your refill to be completed.          Additional Information About Your Visit        MyChart Information     [employer] lets you send messages to your doctor, view your test results, renew your prescriptions, schedule appointments and more. To sign up, go to [email or web address] . Click on \"Log in\" on the left side of the screen, which will take you to the Welcome page. Then click on \"Sign up Now\" on the right side of the page.     You will be asked to enter the access code listed below, as well as some personal information. Please follow the directions to create your username and password.     Your access code is: [email or web address]  Expires: 2019 11:51 AM     Your access code will  in 90 days. If you need help or a new " code, please call your [place] clinic or [phone].        Care EveryWhere ID     This is your Care EveryWhere ID. This could be used by other organizations to access your [place] medical records  [id number]        Your Vitals Were     Pulse Pulse Oximetry                88 95%           Blood Pressure from Last 3 Encounters:   11/28/18 118/80   11/05/18 (!) 143/99   10/18/18 (!) 130/102    Weight from Last 3 Encounters:   11/05/18 262 lb (118.8 kg)   10/18/18 266 lb 8 oz (120.9 kg)   09/18/18 265 lb (120.2 kg)              Today, you had the following     No orders found for display       Primary Care Provider Office Phone # Fax #    John Hayes PA-C [phone]       [id number]  [facility] [id number]        Equal Access to Services     [facility]: Hadii aad ku hadasho Soomaali, waaxda luqadaha, qaybta kaalmada adeegyada, waxay perezin hayaan adeterri fontenot . So [facility] [phone].    ATENCIÓN: Si habla español, tiene a gillis disposición servicios gratuitos de asistencia lingüística. Llame al [phone].    We comply with applicable federal civil rights laws and [place] laws. We do not discriminate on the basis of race, color, national origin, age, disability, sex, sexual orientation, or gender identity.            Thank you!     Thank you for choosing [facility]  for your care. Our goal is always to provide you with excellent care. Hearing back from our patients is one way we can continue to improve our services. Please take a few minutes to complete the written survey that you may receive in the mail after your visit with us. Thank you!             Your Updated Medication List - Protect others around you: Learn how to safely use, store and throw away your medicines at www.disposemymeds.org.          This list is accurate as of 11/28/18 11:23 AM.  Always use your most recent med list.                   Brand Name Dispense Instructions for use  Diagnosis    ibuprofen 800 MG tablet    ADVIL/MOTRIN    90 tablet    Take 1 tablet (800 mg) by mouth every 8 hours as needed for moderate pain    Umbilical hernia without obstruction and without gangrene       senna-docusate 8.6-50 MG tablet    SENOKOT-S/PERICOLACE    30 tablet    Take 1-2 tablets by mouth 2 times daily    Umbilical hernia without obstruction and without gangrene

## 2018-11-29 ENCOUNTER — TELEPHONE (OUTPATIENT)
Dept: FAMILY MEDICINE | Facility: CLINIC | Age: 33
End: 2018-11-29

## 2018-11-29 NOTE — TELEPHONE ENCOUNTER
Type of outreach:  None  Health Maintenance Due   Topic Date Due     TETANUS IMMUNIZATION (SYSTEM ASSIGNED)  07/24/2005     HIV SCREEN (SYSTEM ASSIGNED)  07/24/2005     INFLUENZA VACCINE (1) 09/01/2018     Patient had OV on 10/18/18, BP taken and discussed at this visit. Patient is to F/U around 1/18/19.  Viki George MA

## 2019-04-01 ENCOUNTER — APPOINTMENT (OUTPATIENT)
Dept: GENERAL RADIOLOGY | Facility: CLINIC | Age: 34
End: 2019-04-01
Attending: EMERGENCY MEDICINE
Payer: COMMERCIAL

## 2019-04-01 ENCOUNTER — HOSPITAL ENCOUNTER (EMERGENCY)
Facility: CLINIC | Age: 34
Discharge: HOME | End: 2019-04-01
Attending: EMERGENCY MEDICINE | Admitting: EMERGENCY MEDICINE
Payer: COMMERCIAL

## 2019-04-01 VITALS
WEIGHT: 275 LBS | HEART RATE: 91 BPM | SYSTOLIC BLOOD PRESSURE: 130 MMHG | HEIGHT: 74 IN | RESPIRATION RATE: 18 BRPM | TEMPERATURE: 98.8 F | OXYGEN SATURATION: 96 % | DIASTOLIC BLOOD PRESSURE: 109 MMHG | BODY MASS INDEX: 35.29 KG/M2

## 2019-04-01 DIAGNOSIS — Z72.0 TOBACCO ABUSE: ICD-10-CM

## 2019-04-01 DIAGNOSIS — R00.0 SINUS TACHYCARDIA: ICD-10-CM

## 2019-04-01 DIAGNOSIS — R03.0 ELEVATED BLOOD PRESSURE READING WITHOUT DIAGNOSIS OF HYPERTENSION: ICD-10-CM

## 2019-04-01 DIAGNOSIS — Z78.9 ALCOHOL USE: ICD-10-CM

## 2019-04-01 DIAGNOSIS — R07.9 CHEST PAIN, UNSPECIFIED TYPE: ICD-10-CM

## 2019-04-01 LAB
ALBUMIN SERPL-MCNC: 3.9 G/DL (ref 3.4–5)
ALP SERPL-CCNC: 94 U/L (ref 40–150)
ALT SERPL W P-5'-P-CCNC: 83 U/L (ref 0–70)
ANION GAP SERPL CALCULATED.3IONS-SCNC: 9 MMOL/L (ref 3–14)
AST SERPL W P-5'-P-CCNC: 42 U/L (ref 0–45)
BASOPHILS # BLD AUTO: 0.1 10E9/L (ref 0–0.2)
BASOPHILS NFR BLD AUTO: 1 %
BILIRUB SERPL-MCNC: 0.2 MG/DL (ref 0.2–1.3)
BUN SERPL-MCNC: 8 MG/DL (ref 7–30)
CALCIUM SERPL-MCNC: 8.6 MG/DL (ref 8.5–10.1)
CHLORIDE SERPL-SCNC: 113 MMOL/L (ref 94–109)
CO2 SERPL-SCNC: 22 MMOL/L (ref 20–32)
CREAT SERPL-MCNC: 0.82 MG/DL (ref 0.66–1.25)
D DIMER PPP FEU-MCNC: 0.3 UG/ML FEU (ref 0–0.5)
DIFFERENTIAL METHOD BLD: NORMAL
EOSINOPHIL # BLD AUTO: 0.1 10E9/L (ref 0–0.7)
EOSINOPHIL NFR BLD AUTO: 1.6 %
ERYTHROCYTE [DISTWIDTH] IN BLOOD BY AUTOMATED COUNT: 12.2 % (ref 10–15)
ETHANOL SERPL-MCNC: 0.08 G/DL
GFR SERPL CREATININE-BSD FRML MDRD: >90 ML/MIN/{1.73_M2}
GLUCOSE SERPL-MCNC: 102 MG/DL (ref 70–99)
HCT VFR BLD AUTO: 49.2 % (ref 40–53)
HGB BLD-MCNC: 16.4 G/DL (ref 13.3–17.7)
IMM GRANULOCYTES # BLD: 0.1 10E9/L (ref 0–0.4)
IMM GRANULOCYTES NFR BLD: 1.2 %
INTERPRETATION ECG - MUSE: NORMAL
LACTATE BLD-SCNC: 1.9 MMOL/L (ref 0.7–2)
LACTATE BLD-SCNC: 2.6 MMOL/L (ref 0.7–2)
LIPASE SERPL-CCNC: 210 U/L (ref 73–393)
LYMPHOCYTES # BLD AUTO: 1.4 10E9/L (ref 0.8–5.3)
LYMPHOCYTES NFR BLD AUTO: 23.4 %
MCH RBC QN AUTO: 30.7 PG (ref 26.5–33)
MCHC RBC AUTO-ENTMCNC: 33.3 G/DL (ref 31.5–36.5)
MCV RBC AUTO: 92 FL (ref 78–100)
MONOCYTES # BLD AUTO: 0.5 10E9/L (ref 0–1.3)
MONOCYTES NFR BLD AUTO: 9 %
NEUTROPHILS # BLD AUTO: 3.7 10E9/L (ref 1.6–8.3)
NEUTROPHILS NFR BLD AUTO: 63.8 %
NRBC # BLD AUTO: 0 10*3/UL
NRBC BLD AUTO-RTO: 0 /100
PLATELET # BLD AUTO: 225 10E9/L (ref 150–450)
POTASSIUM SERPL-SCNC: 3.8 MMOL/L (ref 3.4–5.3)
PROT SERPL-MCNC: 7.6 G/DL (ref 6.8–8.8)
RBC # BLD AUTO: 5.34 10E12/L (ref 4.4–5.9)
SODIUM SERPL-SCNC: 144 MMOL/L (ref 133–144)
TROPONIN I SERPL-MCNC: <0.015 UG/L (ref 0–0.04)
TSH SERPL DL<=0.005 MIU/L-ACNC: 0.98 MU/L (ref 0.4–4)
WBC # BLD AUTO: 5.8 10E9/L (ref 4–11)

## 2019-04-01 PROCEDURE — 84443 ASSAY THYROID STIM HORMONE: CPT | Performed by: EMERGENCY MEDICINE

## 2019-04-01 PROCEDURE — 85025 COMPLETE CBC W/AUTO DIFF WBC: CPT | Performed by: EMERGENCY MEDICINE

## 2019-04-01 PROCEDURE — 71046 X-RAY EXAM CHEST 2 VIEWS: CPT

## 2019-04-01 PROCEDURE — 80320 DRUG SCREEN QUANTALCOHOLS: CPT | Performed by: EMERGENCY MEDICINE

## 2019-04-01 PROCEDURE — 93005 ELECTROCARDIOGRAM TRACING: CPT

## 2019-04-01 PROCEDURE — 83690 ASSAY OF LIPASE: CPT | Performed by: EMERGENCY MEDICINE

## 2019-04-01 PROCEDURE — 83605 ASSAY OF LACTIC ACID: CPT | Performed by: EMERGENCY MEDICINE

## 2019-04-01 PROCEDURE — 80053 COMPREHEN METABOLIC PANEL: CPT | Performed by: EMERGENCY MEDICINE

## 2019-04-01 PROCEDURE — 96360 HYDRATION IV INFUSION INIT: CPT

## 2019-04-01 PROCEDURE — 99285 EMERGENCY DEPT VISIT HI MDM: CPT | Mod: 25

## 2019-04-01 PROCEDURE — 85379 FIBRIN DEGRADATION QUANT: CPT | Performed by: EMERGENCY MEDICINE

## 2019-04-01 PROCEDURE — 84484 ASSAY OF TROPONIN QUANT: CPT | Performed by: EMERGENCY MEDICINE

## 2019-04-01 PROCEDURE — 36415 COLL VENOUS BLD VENIPUNCTURE: CPT | Performed by: EMERGENCY MEDICINE

## 2019-04-01 PROCEDURE — 25000128 H RX IP 250 OP 636: Performed by: EMERGENCY MEDICINE

## 2019-04-01 RX ADMIN — SODIUM CHLORIDE 1000 ML: 9 INJECTION, SOLUTION INTRAVENOUS at 13:32

## 2019-04-01 ASSESSMENT — ENCOUNTER SYMPTOMS
SHORTNESS OF BREATH: 0
NAUSEA: 0
COUGH: 0
DIARRHEA: 0
VOMITING: 0
FEVER: 0

## 2019-04-01 ASSESSMENT — MIFFLIN-ST. JEOR: SCORE: 2272.14

## 2019-04-01 NOTE — ED PROVIDER NOTES
"  History     Chief Complaint:  Chest Pain    The history is provided by the patient and the EMS personnel.      Nikolas Yo is a 33 year old male smoker who presents for evaluation of chest pain. He was driving to work at just under 1 hour prior to arrival, when he developed left sided chest pain and left upper extremity tingling. He pulled over and called EMS. They found him to be hypertensive and tachycardic and was given 1 mg of Ativan, 2 nitroglycerin, and 324 mg of aspirin without obvious improvement in symptoms. However, upon interview, he reports chest pain is currently only 2/10 which he describes as an \"aching, cramping\" sensation without exacerbating factors. He has not had recent illness including cough, fever, diarrhea, nausea, or vomiting. He denies associated shortness of breath. He denies heavy caffeine use this morning. However, he drinks 2-3 alcoholic drinks daily and had 12 beers last night. He has had no recent travel or surgery.    Notably, patient states he was told to see his PCP as he may need to be put on antihypertensives, but has not done so.     Allergies:  NKDA    Medications:    Senokot    Past Medical History:    HTN    Past Surgical History:    Herniorrhaphy    Family History:    No past pertinent family history.  Denies any familial history of VTE or clotting disorder.     Social History:  Marital Status:  Single   Current Smoker  Alcohol use: yes, as per HPI  Occasional marijuana use     Review of Systems   Constitutional: Negative for fever.   Respiratory: Negative for cough and shortness of breath.    Cardiovascular: Positive for chest pain.   Gastrointestinal: Negative for diarrhea, nausea and vomiting.   Neurological: Positive for numbness (tingling sensation left upper extremity).   All other systems reviewed and are negative.    Physical Exam     Patient Vitals for the past 24 hrs:   BP Temp Temp src Pulse Heart Rate Resp SpO2 Height Weight   04/01/19 1540 -- -- -- -- 63 " "9 -- -- --   04/01/19 1535 -- -- -- -- 86 11 -- -- --   04/01/19 1530 (!) 142/97 -- -- 96 95 15 -- -- --   04/01/19 1525 -- -- -- -- 96 15 -- -- --   04/01/19 1520 -- -- -- -- 92 16 -- -- --   04/01/19 1515 (!) 133/98 -- -- 95 92 25 -- -- --   04/01/19 1510 -- -- -- -- 93 15 -- -- --   04/01/19 1505 -- -- -- -- 91 -- -- -- --   04/01/19 1500 (!) 132/96 -- -- 98 98 -- -- -- --   04/01/19 1455 -- -- -- -- 97 19 -- -- --   04/01/19 1450 -- -- -- -- 93 -- -- -- --   04/01/19 1445 (!) 134/93 -- -- 93 97 17 -- -- --   04/01/19 1440 -- -- -- -- 89 14 -- -- --   04/01/19 1430 135/89 -- -- 99 95 11 -- -- --   04/01/19 1425 -- -- -- -- 87 12 -- -- --   04/01/19 1415 (!) 139/93 -- -- 98 98 8 -- -- --   04/01/19 1405 -- -- -- -- 98 17 -- -- --   04/01/19 1400 (!) 138/100 -- -- 99 102 16 -- -- --   04/01/19 1355 -- -- -- -- 98 16 -- -- --   04/01/19 1350 -- -- -- -- 94 9 -- -- --   04/01/19 1345 (!) 134/98 -- -- 103 104 9 -- -- --   04/01/19 1340 -- -- -- -- 102 18 -- -- --   04/01/19 1335 (!) 130/98 -- -- -- 105 23 -- -- --   04/01/19 1330 (!) 130/98 -- -- 108 106 12 -- -- --   04/01/19 1325 -- -- -- -- 114 15 -- -- --   04/01/19 1320 -- -- -- -- 114 12 -- -- --   04/01/19 1315 (!) 120/94 -- -- 112 114 -- -- -- --   04/01/19 1310 -- -- -- -- 116 10 93 % -- --   04/01/19 1305 -- -- -- -- 112 13 93 % -- --   04/01/19 1300 (!) 132/94 -- -- 116 114 21 -- -- --   04/01/19 1255 -- -- -- -- -- -- -- 1.88 m (6' 2\") 124.7 kg (275 lb)   04/01/19 1252 (!) 140/93 98.8  F (37.1  C) Oral 127 -- 18 95 % -- --   04/01/19 1250 (!) 140/93 -- -- 127 -- -- 95 % -- --     Physical Exam  General: Well-developed and well-nourished. Well appearing young  man. Cooperative.  Head:  Atraumatic.  Eyes:  Conjunctivae, lids, and sclerae are normal.  ENT:    Normal nose. Moist mucous membranes.  Neck:  Supple. Normal range of motion.  CV:  Tachycardic rate and regular rhythm. Normal heart sounds with no murmurs, rubs, or gallops detected.  Resp:  No " respiratory distress. Clear to auscultation bilaterally without decreased breath sounds, wheezing, rales, or rhonchi.  GI:  Soft. Non-distended. Non-tender.    MS:  Normal ROM. No bilateral lower extremity edema or calf tenderness.  Skin:  Warm. Non-diaphoretic. No pallor.  Neuro:  Awake. A&Ox3. Normal strength.  Psych: Normal mood and affect. Normal speech.  Vitals reviewed.    Emergency Department Course   ECG:  Indication: Chest Pain  Time: 1253  Rate 121 bpm. GA interval 154. QRS duration 96. QT/QTc 312/443.   Sinus tachycardia. Otherwise normal ECG   No acute ST changes.  No previous EKG for comparison.    Imaging:  Radiographic findings were communicated with the patient who voiced understanding of the findings.  XR Chest 2 views:   No acute cardiopulmonary abnormalities. As per radiology.     Laboratory:  CBC: WBC: 5.8, HGB: 16.4, PLT: 225  CMP: Glucose 102 (H), Chloride 113 (H), ALT 83 (H), o/w WNL (Creatinine: 0.82)  Lipase: 210    1402 Lactic acid: 2.6 (H)  1657 Lactic acid: 1.9    1301 Troponin: <0.015  D dimer: 0.3    Alcohol ethyl: 0.08 (H)    TSH: 0.98    Interventions:  1332 NS 1L IV  Please see MAR for full list of medications administered in the ED.    Emergency Department Course:  Nursing notes and vitals reviewed. (1604) I performed an exam of the patient as documented above.     IV inserted. Medicine administered as documented above. Blood drawn. This was sent to the lab for further testing, results above.    The patient was sent for a Chest XR while in the emergency department, findings above.     EKG obtained in the ED, see results above.     (5439) I rechecked the patient and discussed the results of his workup thus far.     Findings and plan explained to the patient. Patient discharged home with instructions regarding supportive care, medications, and reasons to return. The importance of close follow-up was reviewed.    I personally reviewed the laboratory results with the patient and  answered all related questions prior to discharge.     Impression & Plan      CMS Diagnoses: The Lactic acid level is elevated due to alcohol use and tachycadia, at this time there is no sign of severe sepsis or septic shock.     Medical Decision Making:  Nikolas is a 33-year-old man who presents with chest pain that started about 1 hour prior to arrival associated with a tingling sensation in his left upper extremity.  Paramedics found him to be hypertensive and tachycardic and he had no improvement in his symptoms with Ativan, nitroglycerin, and aspirin.  However, he reports his current pain is only 2/10.  He denies any recent illnesses or any other concerns.  He does have frequent alcohol use including 12 beers last night.  He is a smoker.  He appears well on exam without focal findings including no lower extremity edema and clear lungs.  However, he is tachycardic to 120s.  EKG confirms sinus tachycardia without ST changes.  My suspicion for ACS is quite low in this young otherwise healthy man.  Troponin is undetectable which is quite reassuring. HEART score would be 1; however, I do not feel urgent stress testing is indicated. This could be discussed in follow up with primary care. D-dimer is negative which essentially rules out pulmonary embolus.  He has no pancreatitis, significant electrolyte abnormalities, kidney injury, or evidence of biliary obstruction.  A very mild elevation in ALT is likely related to his alcohol use.  Indeed, his blood alcohol level is elevated at 0.08.  There is no leukocytosis or anemia to explain his symptoms.  TSH is normal and thyroid etiology of his tachycardia is unlikely.  Chest x-ray reveals no acute abnormality including pneumonia, pneumothorax, or pleural effusion. The mediastinum is narrow and dimer is normal which both speak against aortic dissection. Interestingly, initial lactic acid is elevated at 2.6.  I believe this is likely related to his tachycardia and his alcohol  use last night.  After a single liter of IV fluids this normalized to 1.9.  He was reevaluated at the completion of his workup and states he is feeling well.  He still has some mild intermittent left-sided chest pain though his tachycardia has resolved with the aforementioned fluids.  At this time, I believe patient is appropriate for discharge.  Exact etiology is chest pain is unclear though there is no evidence of an emergent or life-threatening process at this time.  He does remark that he has been told in the past that he needs medications for hypertension.  He has had systolics ranging from 120-142 as well as elevated diastolics of .  Certainly he could have essential hypertension that would require antihypertensives though I have discussed with him that lifestyle modifications including exercising, healthy diet, weight loss, tobacco cessation, and alcohol cessation may also provide similar benefits.  I recommended he call primary care to establish care and arrange follow-up appointment later this week to discuss his elevated blood pressure readings, chest pain, and other general health maintenance.  However, in the interim, patient was invited to return with any new or concerning symptoms including severe worsening of his chest pain.  All of his and his family's questions were answered and they verbalized understanding.  Amenable to discharge.    Diagnosis:    ICD-10-CM    1. Chest pain, unspecified type R07.9    2. Alcohol use Z78.9    3. Sinus tachycardia R00.0    4. Elevated blood pressure reading without diagnosis of hypertension R03.0    5. Tobacco abuse Z72.0        Disposition:  discharged home    Scribe Disclosure:  I, Patsy Zhu, am serving as a scribe on 4/1/2019 at 12:53 PM to personally document services performed by Christin Rivera MD based on my observations and the provider's statements to me.     Patsy Zhu  4/1/2019   [facility] EMERGENCY DEPARTMENT        Christin Rivera MD  04/04/19 5066

## 2019-04-01 NOTE — ED TRIAGE NOTES
Pt was driving to work at 1155 and developed pain in left chest with numbness in the left arm.  Pt was tachycardic.  Pt pulled over and called EMS.  Pt was hypertensive and tachycardic for EMS.  Pt given 1mg IV ativan and 2 nitroglycerin and 324 asa.  Pt feels same after medications.

## 2019-04-01 NOTE — DISCHARGE INSTRUCTIONS
Call primary care to arrange follow-up appointment later this week.  You should discuss your chest pain as well as elevated blood pressure readings.  Try to stop smoking and decrease your alcohol use.  Return immediately with new or concerning symptoms including - but not limited to - uncontrolled chest pain, shortness of breath, dizziness, or numbness.

## 2019-04-01 NOTE — ED AVS SNAPSHOT
[facility] Emergency Department  [address]  [facility] [address]  Phone:  [phone]  Fax:  [phone]                                    Nikolas Yo   MRN: [id number]    Department:  [facility] Emergency Department   Date of Visit:  4/1/2019           After Visit Summary Signature Page    I have received my discharge instructions, and my questions have been answered. I have discussed any challenges I see with this plan with the nurse or doctor.    ..........................................................................................................................................  Patient/Patient Representative Signature      ..........................................................................................................................................  Patient Representative Print Name and Relationship to Patient    ..................................................               ................................................  Date                                   Time    ..........................................................................................................................................  Reviewed by Signature/Title    ...................................................              ..............................................  Date                                               Time          22EPIC Rev 08/18

## 2019-04-04 ASSESSMENT — ENCOUNTER SYMPTOMS: NUMBNESS: 1

## 2019-04-08 ENCOUNTER — OFFICE VISIT (OUTPATIENT)
Dept: FAMILY MEDICINE | Facility: CLINIC | Age: 34
End: 2019-04-08
Payer: COMMERCIAL

## 2019-04-08 VITALS
OXYGEN SATURATION: 96 % | RESPIRATION RATE: 17 BRPM | SYSTOLIC BLOOD PRESSURE: 144 MMHG | HEART RATE: 98 BPM | HEIGHT: 75 IN | TEMPERATURE: 98.1 F | BODY MASS INDEX: 33.85 KG/M2 | DIASTOLIC BLOOD PRESSURE: 92 MMHG | WEIGHT: 272.2 LBS

## 2019-04-08 DIAGNOSIS — R40.0 DAYTIME SOMNOLENCE: ICD-10-CM

## 2019-04-08 DIAGNOSIS — Z13.6 CARDIOVASCULAR SCREENING; LDL GOAL LESS THAN 160: ICD-10-CM

## 2019-04-08 DIAGNOSIS — R07.89 ATYPICAL CHEST PAIN: ICD-10-CM

## 2019-04-08 DIAGNOSIS — I10 ESSENTIAL HYPERTENSION: ICD-10-CM

## 2019-04-08 DIAGNOSIS — R06.83 SNORING: ICD-10-CM

## 2019-04-08 DIAGNOSIS — Z00.00 ENCOUNTER FOR ROUTINE ADULT HEALTH EXAMINATION WITHOUT ABNORMAL FINDINGS: Primary | ICD-10-CM

## 2019-04-08 PROCEDURE — 90715 TDAP VACCINE 7 YRS/> IM: CPT | Performed by: PHYSICIAN ASSISTANT

## 2019-04-08 PROCEDURE — 99213 OFFICE O/P EST LOW 20 MIN: CPT | Mod: 25 | Performed by: PHYSICIAN ASSISTANT

## 2019-04-08 PROCEDURE — 99395 PREV VISIT EST AGE 18-39: CPT | Mod: 25 | Performed by: PHYSICIAN ASSISTANT

## 2019-04-08 PROCEDURE — 80061 LIPID PANEL: CPT | Performed by: PHYSICIAN ASSISTANT

## 2019-04-08 PROCEDURE — 36415 COLL VENOUS BLD VENIPUNCTURE: CPT | Performed by: PHYSICIAN ASSISTANT

## 2019-04-08 PROCEDURE — 90471 IMMUNIZATION ADMIN: CPT | Performed by: PHYSICIAN ASSISTANT

## 2019-04-08 RX ORDER — LISINOPRIL 10 MG/1
10 TABLET ORAL DAILY
Qty: 90 TABLET | Refills: 0 | Status: SHIPPED | OUTPATIENT
Start: 2019-04-08 | End: 2019-07-29

## 2019-04-08 ASSESSMENT — ENCOUNTER SYMPTOMS
NERVOUS/ANXIOUS: 1
DIZZINESS: 1

## 2019-04-08 ASSESSMENT — ANXIETY QUESTIONNAIRES
7. FEELING AFRAID AS IF SOMETHING AWFUL MIGHT HAPPEN: MORE THAN HALF THE DAYS
6. BECOMING EASILY ANNOYED OR IRRITABLE: NOT AT ALL
5. BEING SO RESTLESS THAT IT IS HARD TO SIT STILL: NOT AT ALL
2. NOT BEING ABLE TO STOP OR CONTROL WORRYING: SEVERAL DAYS
3. WORRYING TOO MUCH ABOUT DIFFERENT THINGS: NOT AT ALL
IF YOU CHECKED OFF ANY PROBLEMS ON THIS QUESTIONNAIRE, HOW DIFFICULT HAVE THESE PROBLEMS MADE IT FOR YOU TO DO YOUR WORK, TAKE CARE OF THINGS AT HOME, OR GET ALONG WITH OTHER PEOPLE: SOMEWHAT DIFFICULT
1. FEELING NERVOUS, ANXIOUS, OR ON EDGE: NEARLY EVERY DAY
GAD7 TOTAL SCORE: 6

## 2019-04-08 ASSESSMENT — PATIENT HEALTH QUESTIONNAIRE - PHQ9
SUM OF ALL RESPONSES TO PHQ QUESTIONS 1-9: 3
5. POOR APPETITE OR OVEREATING: NOT AT ALL

## 2019-04-08 ASSESSMENT — MIFFLIN-ST. JEOR: SCORE: 2267.38

## 2019-04-08 NOTE — PROGRESS NOTES
SUBJECTIVE:   CC: Nikolas Yo is an 33 year old male who presents for preventative health visit.     Healthy Habits:     Getting at least 3 servings of Calcium per day:  Yes    Bi-annual eye exam:  Yes    Dental care twice a year:  NO    Sleep apnea or symptoms of sleep apnea:  Daytime drowsiness and Excessive snoring    Diet:  Regular (no restrictions) and Low salt    Frequency of exercise:  1 day/week    Duration of exercise:  15-30 minutes    Taking medications regularly:  Yes    Medication side effects:  Not applicable    PHQ-2 Total Score: 2    Additional concerns today:  Yes (Anxiety - seen at [facility] on 4/1/19)    -Patient presents for annual physical   -he had a recent trip to the ED for chest pain/anxiety symptoms   -work up was grossly normal overall there   -in hindsight he does not note a lot of anxiety leading up to the symptoms  -unfortunately he has felt very anxious for recurrence; mostly only when he is getting ready to leave the house  -did experience a similar episode yesterday; took a deep breath and waited and symptoms improved  -wondering if there are any other tests needed  -he drinks very little caffeine  -TSH normal  -denies excess etoh or marijuana    Today's PHQ-2 Score:   PHQ-2 ( 1999 Pfizer) 4/8/2019   Q1: Little interest or pleasure in doing things 0   Q2: Feeling down, depressed or hopeless 2   PHQ-2 Score 2   Q1: Little interest or pleasure in doing things Not at all   Q2: Feeling down, depressed or hopeless More than half the days   PHQ-2 Score 2       Abuse: Current or Past(Physical, Sexual or Emotional)- No  Do you feel safe in your environment? Yes    Social History     Tobacco Use     Smoking status: Current Every Day Smoker     Packs/day: 0.50     Types: Other     Smokeless tobacco: Current User   Substance Use Topics     Alcohol use: Yes     Comment: couple beers per day       Alcohol Use 4/8/2019   Prescreen: >3 drinks/day or >7 drinks/week? Yes   AUDIT SCORE  9   REVIEWED  "today - he does not think this is a problem       Last PSA: No results found for: PSA    Reviewed orders with patient. Reviewed health maintenance and updated orders accordingly - Yes  Labs reviewed in EPIC    Reviewed and updated as needed this visit by clinical staff         Reviewed and updated as needed this visit by Provider          Review of Systems   Cardiovascular: Positive for chest pain.   Neurological: Positive for dizziness.   Psychiatric/Behavioral: The patient is nervous/anxious.    reviewed as per above    OBJECTIVE:   BP (!) 144/92   Pulse 98   Temp 98.1  F (36.7  C) (Tympanic)   Resp 17   Ht 1.892 m (6' 2.5\")   Wt 123.5 kg (272 lb 3.2 oz)   SpO2 96%   BMI 34.48 kg/m      Physical Exam  GENERAL: healthy, alert and no distress  EYES: Eyes grossly normal to inspection, PERRL and conjunctivae and sclerae normal  HENT: ear canals and TM's normal, nose and mouth without ulcers or lesions  NECK: no adenopathy, no asymmetry, masses, or scars and thyroid normal to palpation  RESP: lungs clear to auscultation - no rales, rhonchi or wheezes  CV: regular rate and rhythm, normal S1 S2, no S3 or S4, no murmur, click or rub, no peripheral edema and peripheral pulses strong  ABDOMEN: soft, nontender, no hepatosplenomegaly, no masses and bowel sounds normal  MS: no gross musculoskeletal defects noted, no edema  SKIN: no suspicious lesions or rashes  NEURO: Normal strength and tone, mentation intact and speech normal  PSYCH: mentation appears normal, affect normal/bright and slightly anxious    Diagnostic Test Results:  See a/p    ASSESSMENT/PLAN:   1. Encounter for routine adult health examination without abnormal findings  Reviewed personal and family history. Reviewed age appropriate screenings. Recommended any needed vaccinations. Reviewed his weight this is a major problem with regards to his health. He is motivated to work  - Lipid panel reflex to direct LDL Fasting  - ADMIN 1st VACCINE; TDAP    2. " "Daytime somnolence  3. Snoring  I would highly recommend that he have sleep study performed, especially with elevated BP. Reviewed weight, etoh on the symptoms.   - SLEEP EVALUATION & MANAGEMENT REFERRAL - ADULT -[facility] - [place]  [phone] (Age 18 and up); Future    4. Essential hypertension  New Dx? Will start below as he works towards lifestyle changes. Reviewed side effects. BMP in ED, we'll update in 1 months  - lisinopril (PRINIVIL/ZESTRIL) 10 MG tablet; Take 1 tablet (10 mg) by mouth daily  Dispense: 90 tablet; Refill: 0    5. CARDIOVASCULAR SCREENING; LDL GOAL LESS THAN 160  - Lipid panel reflex to direct LDL Fasting    6. Atypical chest pain  Unclear etiology. Cardiac work up negative. Given his hx I do wonder if this was etoh related with limited sleep, etoh in symptoms and work stress? We'll continue to monitor; consider event monitor if persistent or therapy?      COUNSELING:   Reviewed preventive health counseling, as reflected in patient instructions    BP Readings from Last 1 Encounters:   04/01/19 (!) 130/109     Estimated body mass index is 35.31 kg/m  as calculated from the following:    Height as of 4/1/19: 1.88 m (6' 2\").    Weight as of 4/1/19: 124.7 kg (275 lb).      Weight management plan: Discussed healthy diet and exercise guidelines     reports that he has been smoking other.  He has been smoking about 0.50 packs per day. He uses smokeless tobacco.  Tobacco Cessation Action Plan: Information offered: Patient not interested at this time    Counseling Resources:  ATP IV Guidelines  Pooled Cohorts Equation Calculator  FRAX Risk Assessment  ICSI Preventive Guidelines  Dietary Guidelines for Americans, 2010  USDA's MyPlate  ASA Prophylaxis  Lung CA Screening    John Hayes PA-C  [facility] [place]  "

## 2019-04-08 NOTE — LETTER
April 9, 2019      Nikolas RICHARDS Srinath  [address]  [place] [address]      Kwabena Connor!    Good seeing you yesterday.  Sorry you're feeling so crummy.  However, if this is the event that will ignite a focus on your health then I suppose it was worth it.    I wanted to pass along the results of your cholesterol.  It looks quite elevated and increased risks to the heart health. We dont need medication just yet (because of your age) but you absolutely need to start the diet/exercise focused we discussed at your visit.  I think it's really important to check this yearly now, to make sure it's going in an overall better direction.  Try to adopt a diet rich in fruits, vegetables, and lowfat dairy products with special attention to reduced content of saturated and total fat. Lean meat, like poultry and fish, is best. Regarding exercise, a good goal is to aim for regular aerobic physical activity (e.g., brisk walking, running, biking, swimming) at least 30 minutes per day, most days of the week.  Let me know any questions and go Luke Crum PA-C

## 2019-04-08 NOTE — PROGRESS NOTES
SUBJECTIVE:   CC: Nikolas Yo is an 33 year old male who presents for preventative health visit.     Healthy Habits:     Getting at least 3 servings of Calcium per day:  Yes    Bi-annual eye exam:  Yes    Dental care twice a year:  NO    Sleep apnea or symptoms of sleep apnea:  Daytime drowsiness and Excessive snoring    Diet:  Regular (no restrictions) and Low salt    Frequency of exercise:  1 day/week    Duration of exercise:  15-30 minutes    Taking medications regularly:  Yes    Medication side effects:  Not applicable    PHQ-2 Total Score: 2    Additional concerns today:  No    {Add if <65 person on Medicare  - Required Questions (Optional):753914}      {additional problems to add (Optional):727338}    Today's PHQ-2 Score:   PHQ-2 ( 1999 Pfizer) 4/8/2019   Q1: Little interest or pleasure in doing things 0   Q2: Feeling down, depressed or hopeless 2   PHQ-2 Score 2   Q1: Little interest or pleasure in doing things Not at all   Q2: Feeling down, depressed or hopeless More than half the days   PHQ-2 Score 2       Abuse: Current or Past(Physical, Sexual or Emotional)- No  Do you feel safe in your environment? Yes    Social History     Tobacco Use     Smoking status: Current Every Day Smoker     Packs/day: 0.50     Types: Other     Smokeless tobacco: Current User   Substance Use Topics     Alcohol use: Yes     Comment: couple beers per day     {Rooming Staff- Complete this question if Prescreen response is not shown below for today's visit. If you drink alcohol do you typically have >3 drinks per day or >7 drinks per week? (Optional):374464}    Alcohol Use 4/8/2019   Prescreen: >3 drinks/day or >7 drinks/week? Yes   AUDIT SCORE  9   {add AUDIT responses (Optional) (A score of 7 for adult men is an indication of hazardous drinking; a score of 8 or more is an indication of an alcohol use disorder.  A score of 7 or more for adult women is an indication of hazardous drinking or an alchohol use  "disorder):812361}    Last PSA: No results found for: PSA    Reviewed orders with patient. Reviewed health maintenance and updated orders accordingly - { :425064::\"Yes\"}  {Chronicprobdata (Optional):747278}    Reviewed and updated as needed this visit by clinical staff         Reviewed and updated as needed this visit by Provider        {HISTORY OPTIONS (Optional):019116}    Review of Systems   Cardiovascular: Positive for chest pain.   Neurological: Positive for dizziness.   Psychiatric/Behavioral: The patient is nervous/anxious.          OBJECTIVE:   There were no vitals taken for this visit.    Physical Exam  {Exam Choices (Optional):339513}    {Diagnostic Test Results (Optional):904977::\"Diagnostic Test Results:\",\"none \"}    ASSESSMENT/PLAN:   {Diag Picklist:382326}    COUNSELING:   {MALE COUNSELING MESSAGES:006376::\"Reviewed preventive health counseling, as reflected in patient instructions\"}    BP Readings from Last 1 Encounters:   04/01/19 (!) 130/109     Estimated body mass index is 35.31 kg/m  as calculated from the following:    Height as of 4/1/19: 1.88 m (6' 2\").    Weight as of 4/1/19: 124.7 kg (275 lb).    {BP Counseling- Complete if BP >= 120/80  (Optional):595300}  {Weight Management Plan (ACO) Complete if BMI is abnormal-  Ages 18-64  BMI >24.9.  Age 65+ with BMI <23 or >30 (Optional):303837}     reports that he has been smoking other.  He has been smoking about 0.50 packs per day. He uses smokeless tobacco.  {Tobacco Cessation -- Complete if patient is a smoker (Optional):254082}    Counseling Resources:  ATP IV Guidelines  Pooled Cohorts Equation Calculator  FRAX Risk Assessment  ICSI Preventive Guidelines  Dietary Guidelines for Americans, 2010  USDA's MyPlate  ASA Prophylaxis  Lung CA Screening    John Hayes PA-C  [facility]  "

## 2019-04-09 LAB
CHOLEST SERPL-MCNC: 221 MG/DL
HDLC SERPL-MCNC: 38 MG/DL
LDLC SERPL CALC-MCNC: 161 MG/DL
NONHDLC SERPL-MCNC: 183 MG/DL
TRIGL SERPL-MCNC: 111 MG/DL

## 2019-04-09 ASSESSMENT — ANXIETY QUESTIONNAIRES: GAD7 TOTAL SCORE: 6

## 2019-06-06 NOTE — OP NOTE
INSURANCE COVERAGE REGARDING PAYMENT  FOR YOUR COLONOSCOPY    **To obtain a pre-service estimate of your procedure - call [phone] and use option 1 to reach the **    Colon Cancer is the second leading cause of death among cancers, per the [employer].  It is preventable.  Early detection is the key.  Your doctor will determine which tests need to be done for prevention and/or treatment.    If during the course of a screening colonoscopy, our physician finds an abnormality, performs a biopsy or polypectomy (removal of polyp), your insurance company most likely will consider the procedure to be a diagnostic exam and no longer a screening procedure.    Every insurance company is different.  We encourage you to call your insurance company and ask them \"if during the course of a screening colonoscopy, an abnormality is discovered and the physician performs a biopsy or polypectomy, will the procedure fall under your screening benefits or under diagnostic benefits\".  Generally, screening benefits and diagnostic benefits are paid at different levels.  This varies with each insurance company, so we want you to be aware of this prior to your procedure.  You do not have to call your insurance company if you have Medicare.    The authorization staff at [facility] will precertify your colonoscopy.  However, precertification, which serves as a notification is never a guarantee of payment.  If you have questions regarding precertification for your procedure please contact your insurance company.          General Surgery Operative Note      Pre-operative diagnosis: Umbilical hernia   Post-operative diagnosis: Same    Procedure: Umbilical Herniorrhaphy with Bard Ventralex ST Hernia Patch (6.4 cm round)    Surgeon: Alida Garrison MD   Assistant(s): Darrell Healy PA-C  The Physician Assistant was medically necessary for their expertise in prepping, suctioning, suturing and retraction.   Anesthesia: General    Estimated blood loss:  Complications: 1 cc  None   Specimens: * No specimens in log *     INDICATION:  Symptomatic umbilical hernia.  DESCRIPTION OF PROCEDURE: The patient was placed on the table in supine position. The abdomen was prepped and draped in standard sterile fashion. A curvilinear incision just below the umbilicus was made with a blade. The incision was carried down to the subcutaneous tissue. The undersurface of the umbilical skin was  from the hernia sac using the Bovie electrocautery. The hernia sac was reduced back into the abdominal cavity. The hernia defect was 2 cm.  The preperitoneal space was developed circumferentially to allow for placement of the 6.4 cm Ventralex ST patch. The mesh was soaked in Irricept and rinsed with saline.  We then trimmed the leaflets at the level of the fascia and closed the fascia with running 0 PDS suture, incorporating the leaflets of the mesh. Hemostasis was maintained throughout with cautery. We irrigated the wound with Irricept followed by saline. We reapproximated the undersurface of the umbilical skin to the fascia using a 3-0 Vicryl undyed suture. The skin was closed with a running 4-0 Vicryl subcuticular suture and Dermabond. The patient tolerated the procedure well. Sponge and needle counts were correct.   Alida Garrison MD

## 2019-07-29 DIAGNOSIS — I10 ESSENTIAL HYPERTENSION: ICD-10-CM

## 2019-07-29 NOTE — TELEPHONE ENCOUNTER
"Requested Prescriptions   Pending Prescriptions Disp Refills     lisinopril (PRINIVIL/ZESTRIL) 10 MG tablet [Pharmacy Med Name: LISINOPRIL 10MG TABS] 90 tablet 0     Sig: TAKE ONE TABLET BY MOUTH DAILY  Last Written Prescription Date:  4/8/19  Last Fill Quantity: 90 tab,  # refills: 0   Last office visit: 4/8/2019 with prescribing provider:  Freddy   Future Office Visit:   Next 5 appointments (look out 90 days)    Aug 12, 2019 10:00 AM CDT  Office Visit with John Hayes PA-C  [facility] ([facility]) [address] [address] [facility] [address] [address] [phone]             ACE Inhibitors (Including Combos) Protocol Failed - 7/29/2019 11:06 AM        Failed - Blood pressure under 140/90 in past 12 months     BP Readings from Last 3 Encounters:   04/08/19 (!) 144/92   04/01/19 (!) 130/109   11/28/18 118/80                 Passed - Recent (12 mo) or future (30 days) visit within the authorizing provider's specialty     Patient had office visit in the last 12 months or has a visit in the next 30 days with authorizing provider or within the authorizing provider's specialty.  See \"Patient Info\" tab in inbasket, or \"Choose Columns\" in Meds & Orders section of the refill encounter.              Passed - Medication is active on med list        Passed - Patient is age 18 or older        Passed - Normal serum creatinine on file in past 12 months     Recent Labs   Lab Test 04/01/19  1301   CR 0.82             Passed - Normal serum potassium on file in past 12 months     Recent Labs   Lab Test 04/01/19  1301   POTASSIUM 3.8               "

## 2019-07-30 RX ORDER — LISINOPRIL 10 MG/1
TABLET ORAL
Qty: 30 TABLET | Refills: 0 | Status: SHIPPED | OUTPATIENT
Start: 2019-07-30 | End: 2019-08-12

## 2019-07-30 NOTE — TELEPHONE ENCOUNTER
Called patient and informed of providers message, patient already has an appt scheduled for 8/12/19.  Viki George MA

## 2019-07-30 NOTE — TELEPHONE ENCOUNTER
Routing refill request to provider for review/approval because:  BP Readings from Last 3 Encounters:   04/08/19 (!) 144/92   04/01/19 (!) 130/109   11/28/18 118/80

## 2019-07-30 NOTE — TELEPHONE ENCOUNTER
30 days. Please help him schedule. We had started him on a NEW medication in APril and he needs labs and in clinic follow up

## 2019-08-09 NOTE — PROGRESS NOTES
"Subjective     Nikolas Yo is a 34 year old male who presents to clinic today for the following health issues:    HPI   Hypertension Follow-up    Do you check your blood pressure regularly outside of the clinic? No     Are you following a low salt diet? Yes    Are your blood pressures ever more than 140 on the top number (systolic) OR more   than 90 on the bottom number (diastolic), for example 140/90? Not sure, pt is not checking outside of clinic.    How many servings of fruits and vegetables do you eat daily?  0-1, at least 1     On average, how many sweetened beverages do you drink each day (soda, juice, sweet tea, etc)? 0    How many days per week do you miss taking your medication? 0    Nikolas presents for blood pressure follow up  Additionally he notes that \"this whole anxiety thing hasnt cleared up\"  Getting heart palpitations, felt overwhelmed in certain situations   -almost felt like he was going to faint, got sweaty  Feeling up and down mood wise, getting pits in the stomach  Symptoms are worse in the morning, not so much during work; then when home all back to normal  Has tried to do some meditation, diet is improving  There is a remote family hx of anxiety; his aunt; also found out his mom takes medication daily for anxiety  Has tried some online anxiety coaching; occasionally helpful          Patient Active Problem List   Diagnosis     Essential hypertension     Past Surgical History:   Procedure Laterality Date     HERNIORRHAPHY UMBILICAL N/A 11/5/2018    Procedure: open umbilical hernia repair with mesh;  Surgeon: Alida Garrison MD;  Location:  OR       Social History     Tobacco Use     Smoking status: Current Every Day Smoker     Packs/day: 0.50     Types: Other     Smokeless tobacco: Current User     Tobacco comment: Mostly Vaping   Substance Use Topics     Alcohol use: Yes     Comment: couple beers per day     Family History   Problem Relation Age of Onset     Alzheimer Disease " "Maternal Grandmother              Reviewed and updated as needed this visit by Provider         Review of Systems   ROS COMP: Constitutional, HEENT, cardiovascular, pulmonary, gi and gu systems are negative, except as otherwise noted.      Objective    /88   Pulse 90   Temp 98.4  F (36.9  C) (Tympanic)   Resp 18   Ht 1.892 m (6' 2.5\")   Wt 123.7 kg (272 lb 12.8 oz)   SpO2 94%   BMI 34.56 kg/m    Body mass index is 34.56 kg/m .  Physical Exam   GENERAL: healthy, alert and no distress  RESP: lungs clear to auscultation - no rales, rhonchi or wheezes  CV: regular rates and rhythm  PSYCH: mentation appears normal, affect normal/bright, slighty anxious    Diagnostic Test Results:  See A/P        Assessment & Plan     1. Essential hypertension  Just at goal. He notes continued improvement of diet/exercise and wanted to hold steady on med dose today. If still trending high down the road consider increasing dose.  - lisinopril (PRINIVIL/ZESTRIL) 10 MG tablet; Take 1 tablet (10 mg) by mouth daily  Dispense: 90 tablet; Refill: 1  - Basic metabolic panel    2. Adjustment disorder with anxious mood  He has noted this increase since his move up here. Did see ED for symptoms but work up was negative. I have reviewed with him my preference that he consider an WALLY screen as well but he'd like to put that off. He does not favor therapy but instead medication. We reviewed serotonin specific reuptake inhibitor including r/b/se. Start below and follow up in 6 weeks  - FLUoxetine (PROZAC) 20 MG capsule; Take 1 capsule (20 mg) by mouth daily  Dispense: 90 capsule; Refill: 0     Tobacco Cessation:   reports that he has been smoking other.  He has been smoking about 0.50 packs per day. He uses smokeless tobacco.  Tobacco Cessation Action Plan: Information offered: Patient not interested at this time      BMI:   Estimated body mass index is 34.56 kg/m  as calculated from the following:    Height as of this encounter: 1.892 m " "(6' 2.5\").    Weight as of this encounter: 123.7 kg (272 lb 12.8 oz).   Weight management plan: Discussed healthy diet and exercise guidelines      Return in about 6 weeks (around 9/23/2019) for Med Check.    John Hayes PA-C  [facility]      "

## 2019-08-12 ENCOUNTER — OFFICE VISIT (OUTPATIENT)
Dept: FAMILY MEDICINE | Facility: CLINIC | Age: 34
End: 2019-08-12
Payer: COMMERCIAL

## 2019-08-12 VITALS
TEMPERATURE: 98.4 F | OXYGEN SATURATION: 94 % | SYSTOLIC BLOOD PRESSURE: 130 MMHG | WEIGHT: 272.8 LBS | HEIGHT: 75 IN | DIASTOLIC BLOOD PRESSURE: 88 MMHG | RESPIRATION RATE: 18 BRPM | BODY MASS INDEX: 33.92 KG/M2 | HEART RATE: 90 BPM

## 2019-08-12 DIAGNOSIS — F43.22 ADJUSTMENT DISORDER WITH ANXIOUS MOOD: ICD-10-CM

## 2019-08-12 DIAGNOSIS — I10 ESSENTIAL HYPERTENSION: Primary | ICD-10-CM

## 2019-08-12 PROCEDURE — 99214 OFFICE O/P EST MOD 30 MIN: CPT | Performed by: PHYSICIAN ASSISTANT

## 2019-08-12 PROCEDURE — 36415 COLL VENOUS BLD VENIPUNCTURE: CPT | Performed by: PHYSICIAN ASSISTANT

## 2019-08-12 PROCEDURE — 80048 BASIC METABOLIC PNL TOTAL CA: CPT | Performed by: PHYSICIAN ASSISTANT

## 2019-08-12 RX ORDER — LISINOPRIL 10 MG/1
10 TABLET ORAL DAILY
Qty: 90 TABLET | Refills: 1 | Status: SHIPPED | OUTPATIENT
Start: 2019-08-12 | End: 2019-10-11

## 2019-08-12 ASSESSMENT — MIFFLIN-ST. JEOR: SCORE: 2265.1

## 2019-08-12 NOTE — LETTER
August 13, 2019      Nikolas Yo  [address]  [place] [address]      Kwabena Connor,    Good to see you again this week.  Sorry to hear things havent been going as well as we'd luck.  That sounds pretty tough. Hopefully we're going to start moving in the right direction.    The blood work from the visit looked fine. Kidneys and electrolytes tolerating the medication fine.  Glucose was slightly high but less worrisome as you were not fasting.    Let me know any questions. See you in about 6 weeks or so,    Luke Hayes PA-C

## 2019-08-13 LAB
ANION GAP SERPL CALCULATED.3IONS-SCNC: 9 MMOL/L (ref 3–14)
BUN SERPL-MCNC: 11 MG/DL (ref 7–30)
CALCIUM SERPL-MCNC: 9.6 MG/DL (ref 8.5–10.1)
CHLORIDE SERPL-SCNC: 107 MMOL/L (ref 94–109)
CO2 SERPL-SCNC: 24 MMOL/L (ref 20–32)
CREAT SERPL-MCNC: 0.79 MG/DL (ref 0.66–1.25)
GFR SERPL CREATININE-BSD FRML MDRD: >90 ML/MIN/{1.73_M2}
GLUCOSE SERPL-MCNC: 109 MG/DL (ref 70–99)
POTASSIUM SERPL-SCNC: 4.6 MMOL/L (ref 3.4–5.3)
SODIUM SERPL-SCNC: 140 MMOL/L (ref 133–144)

## 2019-10-10 NOTE — PROGRESS NOTES
Subjective     Nikolas Yo is a 34 year old male who presents to clinic today for the following health issues:    HPI   Hypertension Follow-up      Do you check your blood pressure regularly outside of the clinic? No     Are you following a low salt diet? Yes    Are your blood pressures ever more than 140 on the top number (systolic) OR more   than 90 on the bottom number (diastolic), for example 140/90? No    How many servings of fruits and vegetables do you eat daily?  0-1    On average, how many sweetened beverages do you drink each day (soda, juice, sweet tea, etc)?   0    How many days per week do you miss taking your medication? 0    Patient presents to recheck blood pressure and weight   He has really started to focus on his diet choices   -limiting snacking when getting home late at work   -limiting carbs   -has limited soda  Now working on limiting his salt intake and processed foods  Continues to smoke regularly; trying to limit    PROZAC:  He does not feel all that different after we started prozac  He will still get a few episodes of chest pinging during the day  Sometimes does feel like he may faint; will get warm and a wave sensation on the body  He has not had any caffeine since his episode  His worry/anxiety on a day to day basis does not seem improved either  Still will have some anxiety driving to work  Does not think any side effects from the medication  Did start with a therapist last week  He did discuss anxiety with mom and his aunt; mom on ativan    Patient Active Problem List   Diagnosis     Essential hypertension     Adjustment disorder with anxious mood     Past Surgical History:   Procedure Laterality Date     HERNIORRHAPHY UMBILICAL N/A 11/5/2018    Procedure: open umbilical hernia repair with mesh;  Surgeon: Alida Garrison MD;  Location:  OR       Social History     Tobacco Use     Smoking status: Current Every Day Smoker     Packs/day: 0.50     Types: Other     Smokeless  "tobacco: Current User     Tobacco comment: Mostly Vaping   Substance Use Topics     Alcohol use: Yes     Comment: couple beers per day     Family History   Problem Relation Age of Onset     Alzheimer Disease Maternal Grandmother            Reviewed and updated as needed this visit by Provider         Review of Systems   ROS COMP: Constitutional, HEENT, cardiovascular, pulmonary, gi and gu systems are negative, except as otherwise noted.      Objective    /89   Pulse 88   Temp 97  F (36.1  C) (Tympanic)   Resp 16   Ht 1.892 m (6' 2.5\")   Wt 117.2 kg (258 lb 6.4 oz)   SpO2 97%   BMI 32.73 kg/m    Body mass index is 32.73 kg/m .  Physical Exam   GENERAL: healthy, alert and no distress  RESP: lungs clear to auscultation - no rales, rhonchi or wheezes  CV: regular rate and rhythm, normal S1 S2, no S3 or S4, no murmur, click or rub, no peripheral edema and peripheral pulses strong  PSYCH: mentation appears normal, affect normal/bright    Diagnostic Test Results:  Labs reviewed in Epic        Assessment & Plan     1. Adjustment disorder with anxious mood  He did not get much benefit from prozac. Did start therapy. Would prefer to swithc. Trial of below. Did discuss buspirone as well.   - sertraline (ZOLOFT) 50 MG tablet; Take 1 tablet (50 mg) by mouth daily  Dispense: 90 tablet; Refill: 0    2. Essential hypertension  At threshold. I would like to increase this to ensure adequate control  - lisinopril (PRINIVIL/ZESTRIL) 10 MG tablet; Take 2 tablets (20 mg) by mouth daily  Dispense: 90 tablet; Refill: 1  - lisinopril (PRINIVIL/ZESTRIL) 20 MG tablet; Take 1 tablet (20 mg) by mouth daily  Dispense: 90 tablet; Refill: 1    3. Need for prophylactic vaccination and inoculation against influenza  - INFLUENZA VACCINE IM > 6 MONTHS VALENT IIV4 [08546]  - Vaccine Administration, Initial [54434]    4. Atypical chest pain  He continues to have periodic symptoms. His previous work up was wnl. Still, given on going symptoms " "will screen with below  - Zio Patch Holter 48 Hour Adult Pediatric; Future     Tobacco Cessation:   reports that he has been smoking other and cigarettes. He has been smoking about 0.50 packs per day. He uses smokeless tobacco.  Tobacco Cessation Action Plan: Information offered: Patient not interested at this time      BMI:   Estimated body mass index is 32.73 kg/m  as calculated from the following:    Height as of this encounter: 1.892 m (6' 2.5\").    Weight as of this encounter: 117.2 kg (258 lb 6.4 oz).   Weight management plan: Discussed healthy diet and exercise guidelines      Return in about 3 months (around 1/11/2020) for med Check .    John Hayes PA-C  [facility]      "

## 2019-10-11 ENCOUNTER — OFFICE VISIT (OUTPATIENT)
Dept: FAMILY MEDICINE | Facility: CLINIC | Age: 34
End: 2019-10-11
Payer: COMMERCIAL

## 2019-10-11 VITALS
SYSTOLIC BLOOD PRESSURE: 130 MMHG | RESPIRATION RATE: 16 BRPM | BODY MASS INDEX: 32.13 KG/M2 | WEIGHT: 258.4 LBS | DIASTOLIC BLOOD PRESSURE: 89 MMHG | TEMPERATURE: 97 F | HEIGHT: 75 IN | OXYGEN SATURATION: 97 % | HEART RATE: 88 BPM

## 2019-10-11 DIAGNOSIS — R07.89 ATYPICAL CHEST PAIN: ICD-10-CM

## 2019-10-11 DIAGNOSIS — F43.22 ADJUSTMENT DISORDER WITH ANXIOUS MOOD: Primary | ICD-10-CM

## 2019-10-11 DIAGNOSIS — Z23 NEED FOR PROPHYLACTIC VACCINATION AND INOCULATION AGAINST INFLUENZA: ICD-10-CM

## 2019-10-11 DIAGNOSIS — I10 ESSENTIAL HYPERTENSION: ICD-10-CM

## 2019-10-11 PROCEDURE — 90471 IMMUNIZATION ADMIN: CPT | Performed by: PHYSICIAN ASSISTANT

## 2019-10-11 PROCEDURE — 90686 IIV4 VACC NO PRSV 0.5 ML IM: CPT | Performed by: PHYSICIAN ASSISTANT

## 2019-10-11 PROCEDURE — 99214 OFFICE O/P EST MOD 30 MIN: CPT | Mod: 25 | Performed by: PHYSICIAN ASSISTANT

## 2019-10-11 RX ORDER — LISINOPRIL 20 MG/1
20 TABLET ORAL DAILY
Qty: 90 TABLET | Refills: 1 | Status: SHIPPED | OUTPATIENT
Start: 2019-10-11 | End: 2020-06-23

## 2019-10-11 RX ORDER — LISINOPRIL 10 MG/1
20 TABLET ORAL DAILY
Qty: 90 TABLET | Refills: 1
Start: 2019-10-11 | End: 2019-10-11

## 2019-10-11 ASSESSMENT — MIFFLIN-ST. JEOR: SCORE: 2199.78

## 2019-10-11 NOTE — PATIENT INSTRUCTIONS
Buspirone is the other med we discussed    You can call [phone] to schedule your imaging at [facility].

## 2019-12-02 NOTE — ED NOTES
Bed: ED23  Expected date: 4/1/19  Expected time: 12:40 PM  Means of arrival: Ambulance  Comments:  HE- 33y, M, chest pain, no EKG changes per EMS  
Pt denies chest pain.  
Warm

## 2020-06-23 ENCOUNTER — VIRTUAL VISIT (OUTPATIENT)
Dept: FAMILY MEDICINE | Facility: CLINIC | Age: 35
End: 2020-06-23
Payer: COMMERCIAL

## 2020-06-23 ENCOUNTER — ALLIED HEALTH/NURSE VISIT (OUTPATIENT)
Dept: NURSING | Facility: CLINIC | Age: 35
End: 2020-06-23
Payer: COMMERCIAL

## 2020-06-23 ENCOUNTER — TELEPHONE (OUTPATIENT)
Dept: FAMILY MEDICINE | Facility: CLINIC | Age: 35
End: 2020-06-23

## 2020-06-23 VITALS — HEART RATE: 80 BPM | SYSTOLIC BLOOD PRESSURE: 138 MMHG | DIASTOLIC BLOOD PRESSURE: 88 MMHG

## 2020-06-23 DIAGNOSIS — I10 ESSENTIAL HYPERTENSION: Primary | ICD-10-CM

## 2020-06-23 DIAGNOSIS — I10 ESSENTIAL HYPERTENSION: ICD-10-CM

## 2020-06-23 DIAGNOSIS — F43.22 ADJUSTMENT DISORDER WITH ANXIOUS MOOD: ICD-10-CM

## 2020-06-23 PROCEDURE — 99207 ZZC NO CHARGE NURSE ONLY: CPT

## 2020-06-23 PROCEDURE — 99214 OFFICE O/P EST MOD 30 MIN: CPT | Mod: 95 | Performed by: PHYSICIAN ASSISTANT

## 2020-06-23 RX ORDER — LISINOPRIL 20 MG/1
20 TABLET ORAL DAILY
Qty: 90 TABLET | Refills: 1 | Status: SHIPPED | OUTPATIENT
Start: 2020-06-23 | End: 2020-12-31

## 2020-06-23 ASSESSMENT — ANXIETY QUESTIONNAIRES
6. BECOMING EASILY ANNOYED OR IRRITABLE: NOT AT ALL
1. FEELING NERVOUS, ANXIOUS, OR ON EDGE: MORE THAN HALF THE DAYS
5. BEING SO RESTLESS THAT IT IS HARD TO SIT STILL: NOT AT ALL
3. WORRYING TOO MUCH ABOUT DIFFERENT THINGS: MORE THAN HALF THE DAYS
IF YOU CHECKED OFF ANY PROBLEMS ON THIS QUESTIONNAIRE, HOW DIFFICULT HAVE THESE PROBLEMS MADE IT FOR YOU TO DO YOUR WORK, TAKE CARE OF THINGS AT HOME, OR GET ALONG WITH OTHER PEOPLE: SOMEWHAT DIFFICULT
7. FEELING AFRAID AS IF SOMETHING AWFUL MIGHT HAPPEN: SEVERAL DAYS
GAD7 TOTAL SCORE: 5
2. NOT BEING ABLE TO STOP OR CONTROL WORRYING: NOT AT ALL

## 2020-06-23 ASSESSMENT — PATIENT HEALTH QUESTIONNAIRE - PHQ9
SUM OF ALL RESPONSES TO PHQ QUESTIONS 1-9: 2
5. POOR APPETITE OR OVEREATING: NOT AT ALL

## 2020-06-23 NOTE — TELEPHONE ENCOUNTER
Per PCP's note from virtual visit on 6/23/2020, patient needs to schedule nurse only BP check. LM to schedule appointment.  -Claudette Sr

## 2020-06-23 NOTE — PROGRESS NOTES
Nikolas Yo is a 34 year old patient who comes in today for a Blood Pressure check.  Initial BP:  /88 (BP Location: Right arm, Cuff Size: Adult Regular)   Pulse 80      80  Disposition: results routed to provider

## 2020-06-23 NOTE — PROGRESS NOTES
"Nikolas Yo is a 34 year old male who is being evaluated via a billable telephone visit.      The patient has been notified of following:     \"This telephone visit will be conducted via a call between you and your physician/provider. We have found that certain health care needs can be provided without the need for a physical exam.  This service lets us provide the care you need with a short phone conversation.  If a prescription is necessary we can send it directly to your pharmacy.  If lab work is needed we can place an order for that and you can then stop by our lab to have the test done at a later time.    Telephone visits are billed at different rates depending on your insurance coverage. During this emergency period, for some insurers they may be billed the same as an in-person visit.  Please reach out to your insurance provider with any questions.    If during the course of the call the physician/provider feels a telephone visit is not appropriate, you will not be charged for this service.\"    Patient has given verbal consent for Telephone visit?  Yes    What phone number would you like to be contacted at? [phone]    How would you like to obtain your AVS? Mail a copy    Subjective     Nikolas Yo is a 34 year old male who presents via phone visit today for the following health issues:    HPI  Hypertension Follow-up      Do you check your blood pressure regularly outside of the clinic? No     Are you following a low salt diet? Yes    Are your blood pressures ever more than 140 on the top number (systolic) OR more   than 90 on the bottom number (diastolic), for example 140/90? No      How many servings of fruits and vegetables do you eat daily?  0-1    On average, how many sweetened beverages do you drink each day (Examples: soda, juice, sweet tea, etc.  Do NOT count diet or artificially sweetened beverages)? 1-2    How many days per week do you exercise enough to make your heart beat faster? " 5    How many minutes a day do you exercise enough to make your heart beat faster? 30 - 60    How many days per week do you miss taking your medication? 0    Nikolas Yo is a 34 year old male who presents today for telephone visit re BP check and Mood check  At his last visit we increased his BP medication  He felt better immediately  Still sticking to fruit/veggies in the morning, lighter lunch and pre-made meals  Less overeating   Feels well overall  NOT checking BP  Willing to do nurse only   With coronavirus has been less active with direct exercise but more active at work      Medication Followup of Sertraline     Taking Medication as prescribed: Yes    Side Effects: None    Medication Helping Symptoms: Yes      At his last visit we had started him on zoloft after failure of prozac  He now regrets not continuing on zoloft  He had noted definite improvement while on the medication - anxiety and mood were improved  Did note some sleep changes while on the medication but he wasn't sure if that was related to the COVID-19 public health crisis      Patient Active Problem List   Diagnosis     Essential hypertension     Adjustment disorder with anxious mood     Past Surgical History:   Procedure Laterality Date     HERNIORRHAPHY UMBILICAL N/A 11/5/2018    Procedure: open umbilical hernia repair with mesh;  Surgeon: Alida Garrison MD;  Location:  OR       Social History     Tobacco Use     Smoking status: Current Every Day Smoker     Packs/day: 0.50     Types: Other, Cigarettes     Smokeless tobacco: Current User   Substance Use Topics     Alcohol use: Yes     Comment: couple beers per day     Family History   Problem Relation Age of Onset     Alzheimer Disease Maternal Grandmother            Reviewed and updated as needed this visit by Provider         Review of Systems   Constitutional, HEENT, cardiovascular, pulmonary, gi and gu systems are negative, except as otherwise noted.       Objective    Reported vitals:  There were no vitals taken for this visit.   healthy, alert and no distress  PSYCH: Alert and oriented times 3; coherent speech, normal   rate and volume, able to articulate logical thoughts, able   to abstract reason, no tangential thoughts, no hallucinations   or delusions  His affect is normal  RESP: No cough, no audible wheezing, able to talk in full sentences  Remainder of exam unable to be completed due to telephone visits    Diagnostic Test Results:  none         Assessment/Plan:  1. Essential hypertension  Tolerated increase. Will plan for nurse only to check status and update labs in the fall at in clinic appt. Continue with focus on diet/exercise  - lisinopril (ZESTRIL) 20 MG tablet; Take 1 tablet (20 mg) by mouth daily  Dispense: 90 tablet; Refill: 1    2. Adjustment disorder with anxious mood  Did well on medication. Plan to restart and follow up at next in clinic appt this fall. - sertraline (ZOLOFT) 50 MG tablet; Take 1 tablet (50 mg) by mouth daily  Dispense: 90 tablet; Refill: 0    Return in about 4 months (around 10/23/2020) for Lab Work, BP Recheck.      Phone call duration: 14  minutes    John Hayes PA-C

## 2020-06-24 ASSESSMENT — ANXIETY QUESTIONNAIRES: GAD7 TOTAL SCORE: 5

## 2020-12-30 DIAGNOSIS — I10 ESSENTIAL HYPERTENSION: ICD-10-CM

## 2020-12-31 RX ORDER — LISINOPRIL 20 MG/1
TABLET ORAL
Qty: 30 TABLET | Refills: 0 | Status: SHIPPED | OUTPATIENT
Start: 2020-12-31 | End: 2021-01-05

## 2020-12-31 NOTE — TELEPHONE ENCOUNTER
Medication is being filled for 1 time refill only due to:  Patient needs to be seen because due for visit and labs.     Suzy Marsh RN

## 2021-01-04 NOTE — PROGRESS NOTES
Nikolas Yo is a 35 year old male who is being evaluated via a billable telephone visit.      What phone number would you like to be contacted at? [phone]  How would you like to obtain your AVS? Mail a copy  Assessment & Plan     Essential hypertension  He has continued with good activity and diet control. Not checking BP as much lately, but hoping to get a new BP cuff. Continue risk factor management  - lisinopril (ZESTRIL) 20 MG tablet; Take 1 tablet (20 mg) by mouth daily    Adjustment disorder with anxious mood  Excellent control. Some room for improvement. Trial 75mg but ok to move back down as tolerated  - sertraline (ZOLOFT) 50 MG tablet; Take 1.5 tablets (75 mg) by mouth daily         Tobacco Cessation:   reports that he has been smoking other and cigarettes. He has been smoking about 0.50 packs per day. He uses smokeless tobacco.        Return in about 8 weeks (around 3/1/2021) for Physical Exam, Lab Work.    John Hayes PA-C  [facility] [place]    Subjective     Nikolas Yo is a 35 year old male who presents to clinic today for the following health issues  accompanied by his self:    HPI       Hypertension Follow-up      Do you check your blood pressure regularly outside of the clinic? No     Are you following a low salt diet? Yes    Are your blood pressures ever more than 140 on the top number (systolic) OR more   than 90 on the bottom number (diastolic), for example 140/90? No      How many servings of fruits and vegetables do you eat daily?  0-1    On average, how many sweetened beverages do you drink each day (Examples: soda, juice, sweet tea, etc.  Do NOT count diet or artificially sweetened beverages)? 2    How many days per week do you exercise enough to make your heart beat faster? 5    How many minutes a day do you exercise enough to make your heart beat faster? 30 - 60    How many days per week do you miss taking your medication? 0    Nikolas Yo is a  "35 year old male who presents today for multiple med check  He is doing well overall  Obviously holidays were different with the public health crisis;  New job at [employer] for around a year  Has not been checking   Denies any chest pain, shortness of breath or headache  Did have a few days last week with some stress that developed some fleeting tightness  He gets exercise with dog walking; also on breaks   Continues with healthy diet    Medication Followup of Sertraline     Taking Medication as prescribed: Yes    Side Effects: None    Medication Helping Symptoms: Yes     He has continued on sertraline this time  He is \"really liking this\"  Denies any major side effects after starting  Possible some RLS, but improved with low dose magnesium  No longer doing therapy; felt ok off  Does think there is still room for improvement    Review of Systems   Constitutional, HEENT, cardiovascular, pulmonary, gi and gu systems are negative, except as otherwise noted.      Objective           Vitals:  No vitals were obtained today due to virtual visit.    Physical Exam   healthy, alert and no distress  PSYCH: Alert and oriented times 3; coherent speech, normal   rate and volume, able to articulate logical thoughts, able   to abstract reason, no tangential thoughts, no hallucinations   or delusions  His affect is normal  RESP: No cough, no audible wheezing, able to talk in full sentences  Remainder of exam unable to be completed due to telephone visits          Phone call duration: 15 minutes  "

## 2021-01-05 ENCOUNTER — VIRTUAL VISIT (OUTPATIENT)
Dept: FAMILY MEDICINE | Facility: CLINIC | Age: 36
End: 2021-01-05
Payer: COMMERCIAL

## 2021-01-05 DIAGNOSIS — F43.22 ADJUSTMENT DISORDER WITH ANXIOUS MOOD: ICD-10-CM

## 2021-01-05 DIAGNOSIS — I10 ESSENTIAL HYPERTENSION: ICD-10-CM

## 2021-01-05 PROCEDURE — 99214 OFFICE O/P EST MOD 30 MIN: CPT | Mod: 95 | Performed by: PHYSICIAN ASSISTANT

## 2021-01-05 RX ORDER — LISINOPRIL 20 MG/1
20 TABLET ORAL DAILY
Qty: 90 TABLET | Refills: 1 | Status: SHIPPED | OUTPATIENT
Start: 2021-01-05 | End: 2021-08-11

## 2021-01-05 ASSESSMENT — ANXIETY QUESTIONNAIRES
1. FEELING NERVOUS, ANXIOUS, OR ON EDGE: MORE THAN HALF THE DAYS
3. WORRYING TOO MUCH ABOUT DIFFERENT THINGS: NOT AT ALL
IF YOU CHECKED OFF ANY PROBLEMS ON THIS QUESTIONNAIRE, HOW DIFFICULT HAVE THESE PROBLEMS MADE IT FOR YOU TO DO YOUR WORK, TAKE CARE OF THINGS AT HOME, OR GET ALONG WITH OTHER PEOPLE: SOMEWHAT DIFFICULT
6. BECOMING EASILY ANNOYED OR IRRITABLE: NOT AT ALL
5. BEING SO RESTLESS THAT IT IS HARD TO SIT STILL: NOT AT ALL
2. NOT BEING ABLE TO STOP OR CONTROL WORRYING: NOT AT ALL
GAD7 TOTAL SCORE: 4
7. FEELING AFRAID AS IF SOMETHING AWFUL MIGHT HAPPEN: MORE THAN HALF THE DAYS

## 2021-01-05 ASSESSMENT — PATIENT HEALTH QUESTIONNAIRE - PHQ9
5. POOR APPETITE OR OVEREATING: NOT AT ALL
SUM OF ALL RESPONSES TO PHQ QUESTIONS 1-9: 0

## 2021-01-06 ASSESSMENT — ANXIETY QUESTIONNAIRES: GAD7 TOTAL SCORE: 4

## 2021-03-01 ENCOUNTER — OFFICE VISIT (OUTPATIENT)
Dept: FAMILY MEDICINE | Facility: CLINIC | Age: 36
End: 2021-03-01
Payer: COMMERCIAL

## 2021-03-01 VITALS
WEIGHT: 265 LBS | TEMPERATURE: 98.2 F | OXYGEN SATURATION: 98 % | SYSTOLIC BLOOD PRESSURE: 120 MMHG | HEIGHT: 74 IN | BODY MASS INDEX: 34.01 KG/M2 | DIASTOLIC BLOOD PRESSURE: 82 MMHG | RESPIRATION RATE: 16 BRPM | HEART RATE: 78 BPM

## 2021-03-01 DIAGNOSIS — I10 ESSENTIAL HYPERTENSION: ICD-10-CM

## 2021-03-01 DIAGNOSIS — F43.22 ADJUSTMENT DISORDER WITH ANXIOUS MOOD: ICD-10-CM

## 2021-03-01 DIAGNOSIS — Z00.00 ROUTINE GENERAL MEDICAL EXAMINATION AT A HEALTH CARE FACILITY: Primary | ICD-10-CM

## 2021-03-01 DIAGNOSIS — E66.811 CLASS 1 OBESITY IN ADULT, UNSPECIFIED BMI, UNSPECIFIED OBESITY TYPE, UNSPECIFIED WHETHER SERIOUS COMORBIDITY PRESENT: ICD-10-CM

## 2021-03-01 LAB
ALBUMIN SERPL-MCNC: 4.3 G/DL (ref 3.4–5)
ALP SERPL-CCNC: 110 U/L (ref 40–150)
ALT SERPL W P-5'-P-CCNC: 38 U/L (ref 0–70)
ANION GAP SERPL CALCULATED.3IONS-SCNC: 5 MMOL/L (ref 3–14)
AST SERPL W P-5'-P-CCNC: 15 U/L (ref 0–45)
BILIRUB SERPL-MCNC: 0.4 MG/DL (ref 0.2–1.3)
BUN SERPL-MCNC: 15 MG/DL (ref 7–30)
CALCIUM SERPL-MCNC: 9.8 MG/DL (ref 8.5–10.1)
CHLORIDE SERPL-SCNC: 108 MMOL/L (ref 94–109)
CHOLEST SERPL-MCNC: 230 MG/DL
CO2 SERPL-SCNC: 26 MMOL/L (ref 20–32)
CREAT SERPL-MCNC: 0.76 MG/DL (ref 0.66–1.25)
GFR SERPL CREATININE-BSD FRML MDRD: >90 ML/MIN/{1.73_M2}
GLUCOSE SERPL-MCNC: 100 MG/DL (ref 70–99)
HDLC SERPL-MCNC: 45 MG/DL
LDLC SERPL CALC-MCNC: 175 MG/DL
NONHDLC SERPL-MCNC: 185 MG/DL
POTASSIUM SERPL-SCNC: 4.2 MMOL/L (ref 3.4–5.3)
PROT SERPL-MCNC: 7.8 G/DL (ref 6.8–8.8)
SODIUM SERPL-SCNC: 139 MMOL/L (ref 133–144)
TRIGL SERPL-MCNC: 50 MG/DL

## 2021-03-01 PROCEDURE — 36415 COLL VENOUS BLD VENIPUNCTURE: CPT | Performed by: PHYSICIAN ASSISTANT

## 2021-03-01 PROCEDURE — 80053 COMPREHEN METABOLIC PANEL: CPT | Performed by: PHYSICIAN ASSISTANT

## 2021-03-01 PROCEDURE — 99395 PREV VISIT EST AGE 18-39: CPT | Performed by: PHYSICIAN ASSISTANT

## 2021-03-01 PROCEDURE — 80061 LIPID PANEL: CPT | Performed by: PHYSICIAN ASSISTANT

## 2021-03-01 ASSESSMENT — ENCOUNTER SYMPTOMS
PALPITATIONS: 0
ABDOMINAL PAIN: 0
JOINT SWELLING: 0
SHORTNESS OF BREATH: 0
EYE PAIN: 0
NAUSEA: 0
HEMATURIA: 0
DIARRHEA: 0
HEADACHES: 0
NERVOUS/ANXIOUS: 1
DIZZINESS: 0
DYSURIA: 0
HEARTBURN: 0
CHILLS: 0
MYALGIAS: 0
ARTHRALGIAS: 0
COUGH: 0
SORE THROAT: 0
FREQUENCY: 0
CONSTIPATION: 0
FEVER: 0
HEMATOCHEZIA: 0
PARESTHESIAS: 0
WEAKNESS: 0

## 2021-03-01 ASSESSMENT — MIFFLIN-ST. JEOR: SCORE: 2220.75

## 2021-03-01 NOTE — PROGRESS NOTES
"SUBJECTIVE:   Nikolas Yo is a 35 year old male who presents for Preventive Visit.    {Split Bill scripting  The purpose of this visit is to discuss your medical history and prevent health problems before you are sick. You may be responsible for a co-pay, coinsurance, or deductible if your visit today includes services such as checking on a sore throat, having an x-ray or lab test, or treating and evaluating a new or existing condition :737723}  Patient has been advised of split billing requirements and indicates understanding: {Yes and No:688096}   Are you in the first 12 months of your Medicare coverage?  { :594519::\"No\"}    HPI  Do you feel safe in your environment? { :980293}    Have you ever done Advance Care Planning? (For example, a Health Directive, POLST, or a discussion with a medical provider or your loved ones about your wishes): { :586013}    {Hearing Test Done (Optional):119096}   Fall risk  { :789238}  {If any of the above assessments are answered yes, consider ordering appropriate referrals (Optional):418978::\"click delete button to remove this line now\"}  Cognitive Screening { :075947}    {Do you have sleep apnea, excessive snoring or daytime drowsiness? (Optional):691753}    Reviewed and updated as needed this visit by clinical staff                 Reviewed and updated as needed this visit by Provider                Social History     Tobacco Use     Smoking status: Current Every Day Smoker     Packs/day: 0.50     Types: Other, Cigarettes     Smokeless tobacco: Current User   Substance Use Topics     Alcohol use: Yes     Comment: couple beers per day     {Rooming Staff- Complete this question if Prescreen response is not shown below for today's visit. If you drink alcohol do you typically have >3 drinks per day or >7 drinks per week? (Optional):556444}    Alcohol Use 4/8/2019   Prescreen: >3 drinks/day or >7 drinks/week? Yes   AUDIT SCORE  9   {add AUDIT responses (Optional) (A score of 7 " "for adult men is an indication of hazardous drinking; a score of 8 or more is an indication of an alcohol use disorder.  A score of 7 or more for adult women is an indication of hazardous drinking or an alchohol use disorder):448840}    {Outside tests to abstract? :068736}    {additional problems to add (Optional):687191}    Current providers sharing in care for this patient include: {Rooming staff:  Please update Care Team in Rooming Activity, refresh this note and then delete this statement}  Patient Care Team:  John Hayes PA-C as PCP - General (Physician Assistant)  John Hayes PA-C as Assigned PCP    The following health maintenance items are reviewed in Epic and correct as of today:  Health Maintenance   Topic Date Due     ANNUAL REVIEW OF HM ORDERS  1987     ADVANCE CARE PLANNING  1987     Pneumococcal Vaccine: Pediatrics (0 to 5 Years) and At-Risk Patients (6 to 64 Years) (1 of 2 - PPSV23) 07/24/1993     HIV SCREENING  07/24/2002     HEPATITIS C SCREENING  07/24/2005     INFLUENZA VACCINE (1) 09/01/2020     PREVENTIVE CARE VISIT  03/01/2022     DTAP/TDAP/TD IMMUNIZATION (2 - Td) 04/08/2029     PHQ-2  Completed     IPV IMMUNIZATION  Aged Out     MENINGITIS IMMUNIZATION  Aged Out     HEPATITIS B IMMUNIZATION  Aged Out     {Chronicprobdata (optional):679802}  {Mammo Decision Support :325539}    Review of Systems  {ROS COMP (Optional):777536}    OBJECTIVE:   There were no vitals taken for this visit. Estimated body mass index is 32.73 kg/m  as calculated from the following:    Height as of 10/11/19: 1.892 m (6' 2.5\").    Weight as of 10/11/19: 117.2 kg (258 lb 6.4 oz).  Physical Exam  {Exam (Optional) :237593}    {Diagnostic Test Results (Optional):694405::\"Diagnostic Test Results:\",\"Labs reviewed in Epic\"}    ASSESSMENT / PLAN:   {Diag Picklist:189417}    Patient has been advised of split billing requirements and indicates understanding: {YES / " "NO:905834::\"Yes\"}  COUNSELING:  {Medicare Counselin}    Estimated body mass index is 32.73 kg/m  as calculated from the following:    Height as of 10/11/19: 1.892 m (6' 2.5\").    Weight as of 10/11/19: 117.2 kg (258 lb 6.4 oz).    {Weight Management Plan (ACO) Complete if BMI is abnormal-  Ages 18-64  BMI >24.9.  Age 65+ with BMI <23 or >30 (Optional):070064}    He reports that he has been smoking other and cigarettes. He has been smoking about 0.50 packs per day. He uses smokeless tobacco.  Tobacco Cessation Action Plan:   {TOBACCO CESSATION ACTION PLAN:250982}      Appropriate preventive services were discussed with this patient, including applicable screening as appropriate for cardiovascular disease, diabetes, osteopenia/osteoporosis, and glaucoma.  As appropriate for age/gender, discussed screening for colorectal cancer, prostate cancer, breast cancer, and cervical cancer. Checklist reviewing preventive services available has been given to the patient.    Reviewed patients plan of care and provided an AVS. The {CarePlan:937142} for Nikolas meets the Care Plan requirement. This Care Plan has been established and reviewed with the {PATIENT, FAMILY MEMBER, CAREGIVER:882865}.    Counseling Resources:  ATP IV Guidelines  Pooled Cohorts Equation Calculator  Breast Cancer Risk Calculator  Breast Cancer: Medication to Reduce Risk  FRAX Risk Assessment  ICSI Preventive Guidelines  Dietary Guidelines for Americans,   [employer]'s MyPlate  ASA Prophylaxis  Lung CA Screening    [facility]    Identified Health Risks:  "

## 2021-03-01 NOTE — PROGRESS NOTES
SUBJECTIVE:   CC: Nikolas Yo is an 35 year old male who presents for preventative health visit.       Patient has been advised of split billing requirements and indicates understanding: Yes     Healthy Habits:     Getting at least 3 servings of Calcium per day:  Yes    Bi-annual eye exam:  Yes    Dental care twice a year:  NO    Sleep apnea or symptoms of sleep apnea:  None    Diet:  Low salt    Frequency of exercise:  1 day/week    Duration of exercise:  15-30 minutes    Taking medications regularly:  Yes    Medication side effects:  Other    PHQ-2 Total Score: 0    Additional concerns today:  Yes    Nikolas Yo is a 35 year old male who presents today for annual check up  Feels well overall  Does think his increase in sertraline to 75mg has been helpful   -improved undercurrent of anxiety  Getting ready he thinks to quit smoking; down to around a 1/2ppd  Weight up a bit during pandemic; also with cold snap ate a lot more  Less active over winter; does mention some anxiety around working out        Today's PHQ-2 Score:   PHQ-2 ( 1999 Pfizer) 3/1/2021   Q1: Little interest or pleasure in doing things 0   Q2: Feeling down, depressed or hopeless 0   PHQ-2 Score 0   Q1: Little interest or pleasure in doing things Not at all   Q2: Feeling down, depressed or hopeless Not at all   PHQ-2 Score 0       Abuse: Current or Past(Physical, Sexual or Emotional)- No  Do you feel safe in your environment? Yes      Social History     Tobacco Use     Smoking status: Current Every Day Smoker     Packs/day: 0.50     Types: Other, Cigarettes     Smokeless tobacco: Former User   Substance Use Topics     Alcohol use: Yes     Comment: couple beers per day       Alcohol Use 3/1/2021   Prescreen: >3 drinks/day or >7 drinks/week? Yes   AUDIT SCORE  7     AUDIT - Alcohol Use Disorders Identification Test - Reproduced from the World Health Organization Audit 2001 (Second Edition) 3/1/2021   1.  How often do you have a drink  containing alcohol? 2 to 4 times a month   2.  How many drinks containing alcohol do you have on a typical day when you are drinking? 5 or 6   3.  How often do you have five or more drinks on one occasion? Weekly   4.  How often during the last year have you found that you were not able to stop drinking once you had started? Never   5.  How often during the last year have you failed to do what was normally expected of you because of drinking? Never   6.  How often during the last year have you needed a first drink in the morning to get yourself going after a heavy drinking session? Never   7.  How often during the last year have you had a feeling of guilt or remorse after drinking? Never   8.  How often during the last year have you been unable to remember what happened the night before because of your drinking? Never   9.  Have you or someone else been injured because of your drinking? No   10. Has a relative, friend, doctor or other health care worker been concerned about your drinking or suggested you cut down? No   TOTAL SCORE 7   NO CONCERNS    Last PSA: No results found for: PSA    Reviewed orders with patient. Reviewed health maintenance and updated orders accordingly - Yes  Lab work is in process    Reviewed and updated as needed this visit by clinical staff  Tobacco  Allergies  Meds   Med Hx  Surg Hx  Fam Hx  Soc Hx        Reviewed and updated as needed this visit by Provider                    Review of Systems   Constitutional: Negative for chills and fever.   HENT: Negative for congestion, ear pain, hearing loss and sore throat.    Eyes: Negative for pain and visual disturbance.   Respiratory: Negative for cough and shortness of breath.    Cardiovascular: Negative for chest pain, palpitations and peripheral edema.   Gastrointestinal: Negative for abdominal pain, constipation, diarrhea, heartburn, hematochezia and nausea.   Genitourinary: Negative for discharge, dysuria, frequency, genital sores,  "hematuria, impotence and urgency.   Musculoskeletal: Negative for arthralgias, joint swelling and myalgias.   Skin: Negative for rash.   Neurological: Negative for dizziness, weakness, headaches and paresthesias.   Psychiatric/Behavioral: Negative for mood changes. The patient is nervous/anxious.          OBJECTIVE:   /82 (BP Location: Right arm, Patient Position: Sitting, Cuff Size: Adult Large)   Pulse 78   Temp 98.2  F (36.8  C) (Oral)   Resp 16   Ht 1.886 m (6' 2.25\")   Wt 120.2 kg (265 lb)   SpO2 98%   BMI 33.80 kg/m      Physical Exam  GENERAL: healthy, alert and no distress  EYES: Eyes grossly normal to inspection, PERRL and conjunctivae and sclerae normal  HENT: ear canals and TM's normal, nose and mouth without ulcers or lesions  NECK: no adenopathy, no asymmetry, masses, or scars and thyroid normal to palpation  RESP: lungs clear to auscultation - no rales, rhonchi or wheezes  CV: regular rate and rhythm, normal S1 S2, no S3 or S4, no murmur, click or rub, no peripheral edema and peripheral pulses strong  ABDOMEN: soft, nontender, no hepatosplenomegaly, no masses and bowel sounds normal  MS: no gross musculoskeletal defects noted, no edema  SKIN: no suspicious lesions or rashes  NEURO: Normal strength and tone, mentation intact and speech normal  PSYCH: mentation appears normal, affect normal/bright    Diagnostic Test Results:  Labs reviewed in Epic  No peripheral edema     ASSESSMENT/PLAN:   1. Routine general medical examination at a health care facility  Reviewed personal and family history. Reviewed age appropriate screenings. Recommended any needed vaccinations. Not quite ready to quit smoking but soon. Will try cold turkey initially. Declines FLU    2. Essential hypertension  Controlled. Continue present management.     3. Adjustment disorder with anxious mood  Improved on increase dose. Continue present management.     4. Class 1 obesity in adult, unspecified BMI, unspecified obesity " "type, unspecified whether serious comorbidity present  Continue to work at diet and lifestyle/active changes  - Comprehensive metabolic panel (BMP + Alb, Alk Phos, ALT, AST, Total. Bili, TP)  - Lipid panel reflex to direct LDL Fasting    Patient has been advised of split billing requirements and indicates understanding: Yes  COUNSELING:   Reviewed preventive health counseling, as reflected in patient instructions    Estimated body mass index is 33.8 kg/m  as calculated from the following:    Height as of this encounter: 1.886 m (6' 2.25\").    Weight as of this encounter: 120.2 kg (265 lb).     Weight management plan: Discussed healthy diet and exercise guidelines    He reports that he has been smoking other and cigarettes. He has been smoking about 0.50 packs per day. He has quit using smokeless tobacco.  Tobacco Cessation Action Plan: as above        Counseling Resources:  ATP IV Guidelines  Pooled Cohorts Equation Calculator  FRAX Risk Assessment  ICSI Preventive Guidelines  Dietary Guidelines for Americans, 2010  USDA's MyPlate  ASA Prophylaxis  Lung CA Screening    John Hayes PA-C  [facility]  "

## 2021-03-01 NOTE — PROGRESS NOTES
"  3  SUBJECTIVE:   CC: Nikolas Yo is an 35 year old male who presents for preventive health visit.     {Split Bill scripting  The purpose of this visit is to discuss your medical history and prevent health problems before you are sick. You may be responsible for a co-pay, coinsurance, or deductible if your visit today includes services such as checking on a sore throat, having an x-ray or lab test, or treating and evaluating a new or existing condition :145130}  Patient has been advised of split billing requirements and indicates understanding: {Yes and No:409272}  Healthy Habits:    Do you get at least three servings of calcium containing foods daily (dairy, green leafy vegetables, etc.)? { :637473::\"yes\"}    Amount of exercise or daily activities, outside of work: { :335592}    Problems taking medications regularly { :953129::\"No\"}    Medication side effects: { :730217::\"No\"}    Have you had an eye exam in the past two years? { :446416}    Do you see a dentist twice per year? { :269656}    Do you have sleep apnea, excessive snoring or daytime drowsiness?{ :593267}  {Outside tests to abstract? :711318}    {additional problems to add (Optional):707903}    Today's PHQ-2 Score:   PHQ-2 ( 1999 Pfizer) 6/23/2020 4/8/2019   Q1: Little interest or pleasure in doing things 0 0   Q2: Feeling down, depressed or hopeless 0 2   PHQ-2 Score 0 2   Q1: Little interest or pleasure in doing things - Not at all   Q2: Feeling down, depressed or hopeless - More than half the days   PHQ-2 Score - 2     {PHQ-2 LOOK IN ASSESSMENTS (Optional) :482228}  Abuse: Current or Past(Physical, Sexual or Emotional)- {YES/NO/NA:460656}  Do you feel safe in your environment? {YES/NO/NA:187142}    Have you ever done Advance Care Planning? (For example, a Health Directive, POLST, or a discussion with a medical provider or your loved ones about your wishes): { :463110}    Social History     Tobacco Use     Smoking status: Current Every Day Smoker " "    Packs/day: 0.50     Types: Other, Cigarettes     Smokeless tobacco: Current User   Substance Use Topics     Alcohol use: Yes     Comment: couple beers per day     If you drink alcohol do you typically have >3 drinks per day or >7 drinks per week? {ETOH :279725}                      Last PSA: No results found for: PSA    Reviewed orders with patient. Reviewed health maintenance and updated orders accordingly - {Yes/No:466738::\"Yes\"}  {Chronicprobdata (Optional):641013}    Reviewed and updated as needed this visit by clinical staff                 Reviewed and updated as needed this visit by Provider                {HISTORY OPTIONS (Optional):833968}    ROS:  { :144158::\"CONSTITUTIONAL: NEGATIVE for fever, chills, change in weight\",\"INTEGUMENTARY/SKIN: NEGATIVE for worrisome rashes, moles or lesions\",\"EYES: NEGATIVE for vision changes or irritation\",\"ENT: NEGATIVE for ear, mouth and throat problems\",\"RESP: NEGATIVE for significant cough or SOB\",\"CV: NEGATIVE for chest pain, palpitations or peripheral edema\",\"GI: NEGATIVE for nausea, abdominal pain, heartburn, or change in bowel habits\",\" male: negative for dysuria, hematuria, decreased urinary stream, erectile dysfunction, urethral discharge\",\"MUSCULOSKELETAL: NEGATIVE for significant arthralgias or myalgia\",\"NEURO: NEGATIVE for weakness, dizziness or paresthesias\",\"PSYCHIATRIC: NEGATIVE for changes in mood or affect\"}    OBJECTIVE:   There were no vitals taken for this visit.  EXAM:  {Exam Choices:444134}    {Diagnostic Test Results (Optional):730714::\"Diagnostic Test Results:\",\"Labs reviewed in Epic\"}    ASSESSMENT/PLAN:   {Diag Picklist:529470}    Patient has been advised of split billing requirements and indicates understanding: {YES / NO:706623::\"Yes\"}  COUNSELING:  {MALE COUNSELING MESSAGES:930685::\"Reviewed preventive health counseling, as reflected in patient instructions\"}    Estimated body mass index is 32.73 kg/m  as calculated from the following:    " "Height as of 10/11/19: 1.892 m (6' 2.5\").    Weight as of 10/11/19: 117.2 kg (258 lb 6.4 oz).    {Weight Management Plan (ACO) Complete if BMI is abnormal-  Ages 18-64  BMI >24.9.  Age 65+ with BMI <23 or >30 (Optional):255018}    He reports that he has been smoking other and cigarettes. He has been smoking about 0.50 packs per day. He uses smokeless tobacco.  Tobacco Cessation Action Plan:   {TOBACCO CESSATION ACTION PLAN:284949}      Counseling Resources:  ATP IV Guidelines  Pooled Cohorts Equation Calculator  FRAX Risk Assessment  ICSI Preventive Guidelines  Dietary Guidelines for Americans, 2010  USDA's MyPlate  ASA Prophylaxis  Lung CA Screening    John Hayes PA-C  [facility]  "

## 2021-03-09 DIAGNOSIS — F43.22 ADJUSTMENT DISORDER WITH ANXIOUS MOOD: ICD-10-CM

## 2021-08-11 DIAGNOSIS — I10 ESSENTIAL HYPERTENSION: ICD-10-CM

## 2021-08-11 RX ORDER — LISINOPRIL 20 MG/1
TABLET ORAL
Qty: 90 TABLET | Refills: 0 | Status: SHIPPED | OUTPATIENT
Start: 2021-08-11 | End: 2021-10-08

## 2021-08-11 NOTE — LETTER
August 11, 2021      Nikolas Yo  [address] [address] [address]        Dear Mr. Nikolas Yo,    We recently received a call from your pharmacy requesting a refill of your medication Lisinopril.    We are contacting you today to notify you that you are due for a medication check for further refills.    We have authorized one refill of your medication to allow time for you to schedule your appointment.    Please call [phone] to schedule an appointment or if you have MyChart you can schedule with your provider as well.    Taking care of your health is important to us, an ongoing visits with your provider are vital to your care. We look forward to seeing you in the near future.    Thank you for using [facility] for your Medical Needs.          Sincerely,     John Hayes PA-C

## 2021-08-11 NOTE — TELEPHONE ENCOUNTER
Medication is being filled for 1 time refill only due to:  Patient needs to be seen because due for medck..       03/01/2021 John Hayes PA-C Office Visit  Return in about 6 months (around 9/1/2021) for Follow up in 6 months for Medication Check.     Suzy Marsh RN

## 2021-09-15 DIAGNOSIS — F43.22 ADJUSTMENT DISORDER WITH ANXIOUS MOOD: ICD-10-CM

## 2021-09-17 NOTE — TELEPHONE ENCOUNTER
"Medication is being filled for 1 time refill only due to:  Patient needs to be seen because PER PROVIDER:  \"Return in about 6 months (around 9/1/2021) for Follow up in 6 months for Medication Check\". Please contact patient and schedule appointment for further refills.     Gladys Roman RN  "

## 2021-09-17 NOTE — TELEPHONE ENCOUNTER
Called patient LVM for patient to call back .  If patient calls back please help patient make an appt.      Michelle Alcantar

## 2021-10-08 ENCOUNTER — OFFICE VISIT (OUTPATIENT)
Dept: FAMILY MEDICINE | Facility: CLINIC | Age: 36
End: 2021-10-08
Payer: COMMERCIAL

## 2021-10-08 VITALS
OXYGEN SATURATION: 98 % | BODY MASS INDEX: 34.51 KG/M2 | SYSTOLIC BLOOD PRESSURE: 126 MMHG | WEIGHT: 270.6 LBS | DIASTOLIC BLOOD PRESSURE: 80 MMHG | TEMPERATURE: 98.7 F | HEART RATE: 88 BPM | RESPIRATION RATE: 16 BRPM

## 2021-10-08 DIAGNOSIS — I10 ESSENTIAL HYPERTENSION: ICD-10-CM

## 2021-10-08 DIAGNOSIS — F43.22 ADJUSTMENT DISORDER WITH ANXIOUS MOOD: ICD-10-CM

## 2021-10-08 PROCEDURE — 99214 OFFICE O/P EST MOD 30 MIN: CPT | Performed by: PHYSICIAN ASSISTANT

## 2021-10-08 RX ORDER — LISINOPRIL 20 MG/1
20 TABLET ORAL DAILY
Qty: 90 TABLET | Refills: 1 | Status: SHIPPED | OUTPATIENT
Start: 2021-10-08 | End: 2022-06-30

## 2021-10-08 ASSESSMENT — PAIN SCALES - GENERAL: PAINLEVEL: NO PAIN (0)

## 2021-10-08 NOTE — PROGRESS NOTES
"    Assessment & Plan     Adjustment disorder with anxious mood  Controlled. Some increased stress with work but overall ok. Continue present management. He can mychart if wanting to increase to 100mg. Otherwise follow up 6 months for annual  - sertraline (ZOLOFT) 50 MG tablet; TAKE ONE AND ONE-HALF TABLETS BY MOUTH DAILY    Essential hypertension  Well controlled. Continue present management.   - lisinopril (ZESTRIL) 20 MG tablet; Take 1 tablet (20 mg) by mouth daily       BMI:   Estimated body mass index is 34.51 kg/m  as calculated from the following:    Height as of 3/1/21: 1.886 m (6' 2.25\").    Weight as of this encounter: 122.7 kg (270 lb 9.6 oz).     Return in about 6 months (around 4/11/2022) for Physical Exam, Lab Work.    John Hayes PA-C  [facility] [place] is a 36 year old who presents for the following health issues     HPI     Depression and Anxiety Follow-Up    How are you doing with your depression since your last visit? No change    How are you doing with your anxiety since your last visit?  Improved \"Has been better but there are still some bad days\"    Are you having other symptoms that might be associated with depression or anxiety? Yes:  Restless leg (?) - 4 days a week     Have you had a significant life event? No     Do you have any concerns with your use of alcohol or other drugs? No    Social History     Tobacco Use     Smoking status: Current Every Day Smoker     Packs/day: 0.50     Types: Other, Cigarettes     Smokeless tobacco: Former User   Vaping Use     Vaping Use: Never used   Substance Use Topics     Alcohol use: Yes     Comment: Socially      Drug use: Yes     Types: Marijuana     Comment: occassionally     PHQ 4/8/2019 6/23/2020 1/5/2021   PHQ-9 Total Score 3 2 0   Q9: Thoughts of better off dead/self-harm past 2 weeks Not at all Not at all Not at all     CHA-7 SCORE 4/8/2019 6/23/2020 1/5/2021   Total Score 6 5 4     Last PHQ-9 " 1/5/2021   1.  Little interest or pleasure in doing things 0   2.  Feeling down, depressed, or hopeless 0   3.  Trouble falling or staying asleep, or sleeping too much 0   4.  Feeling tired or having little energy 0   5.  Poor appetite or overeating 0   6.  Feeling bad about yourself 0   7.  Trouble concentrating 0   8.  Moving slowly or restless 0   Q9: Thoughts of better off dead/self-harm past 2 weeks 0   PHQ-9 Total Score 0   Difficulty at work, home, or with people Not difficult at all     CHA-7  1/5/2021   1. Feeling nervous, anxious, or on edge 2   2. Not being able to stop or control worrying 0   3. Worrying too much about different things 0   4. Trouble relaxing 0   5. Being so restless that it is hard to sit still 0   6. Becoming easily annoyed or irritable 0   7. Feeling afraid, as if something awful might happen 2   CHA-7 Total Score 4   If you checked any problems, how difficult have they made it for you to do your work, take care of things at home, or get along with other people? Somewhat difficult       Suicide Assessment Five-step Evaluation and Treatment (SAFE-T)      How many servings of fruits and vegetables do you eat daily?  2-3    On average, how many sweetened beverages do you drink each day (Examples: soda, juice, sweet tea, etc.  Do NOT count diet or artificially sweetened beverages)?   0    How many days per week do you exercise enough to make your heart beat faster? 3 or less    How many minutes a day do you exercise enough to make your heart beat faster? 30 - 60    How many days per week do you miss taking your medication? 0    Nikolas Yo is a 36 year old male who presents today for medication check   Overall things are stable for him  Undercurrent of worrying still ok but work is still very stressful  Realizing some stuff is out of his hands    He mentions he has been more active lately and cutting down on etoh but eating more candy    Review of Systems   Constitutional, HEENT,  cardiovascular, pulmonary, gi and gu systems are negative, except as otherwise noted.      Objective    /80 (BP Location: Right arm, Patient Position: Sitting, Cuff Size: Adult Large)   Pulse 88   Temp 98.7  F (37.1  C) (Oral)   Resp 16   Wt 122.7 kg (270 lb 9.6 oz)   SpO2 98%   BMI 34.51 kg/m    Body mass index is 34.51 kg/m .  Physical Exam   GENERAL: healthy, alert and no distress  EYES: Eyes grossly normal to inspection, PERRL and conjunctivae and sclerae normal  NECK: no adenopathy, no asymmetry, masses, or scars and thyroid normal to palpation  RESP: lungs clear to auscultation - no rales, rhonchi or wheezes  CV: regular rate and rhythm, normal S1 S2, no S3 or S4, no murmur, click or rub, no peripheral edema and peripheral pulses strong  MS: No peripheral edema   PSYCH: mentation appears normal, affect normal/bright

## 2021-10-11 ASSESSMENT — PATIENT HEALTH QUESTIONNAIRE - PHQ9
5. POOR APPETITE OR OVEREATING: SEVERAL DAYS
SUM OF ALL RESPONSES TO PHQ QUESTIONS 1-9: 4

## 2021-10-11 ASSESSMENT — ANXIETY QUESTIONNAIRES
5. BEING SO RESTLESS THAT IT IS HARD TO SIT STILL: NOT AT ALL
3. WORRYING TOO MUCH ABOUT DIFFERENT THINGS: NOT AT ALL
IF YOU CHECKED OFF ANY PROBLEMS ON THIS QUESTIONNAIRE, HOW DIFFICULT HAVE THESE PROBLEMS MADE IT FOR YOU TO DO YOUR WORK, TAKE CARE OF THINGS AT HOME, OR GET ALONG WITH OTHER PEOPLE: NOT DIFFICULT AT ALL
6. BECOMING EASILY ANNOYED OR IRRITABLE: NOT AT ALL
2. NOT BEING ABLE TO STOP OR CONTROL WORRYING: NOT AT ALL
1. FEELING NERVOUS, ANXIOUS, OR ON EDGE: SEVERAL DAYS
7. FEELING AFRAID AS IF SOMETHING AWFUL MIGHT HAPPEN: SEVERAL DAYS
GAD7 TOTAL SCORE: 3

## 2021-10-12 ASSESSMENT — ANXIETY QUESTIONNAIRES: GAD7 TOTAL SCORE: 3

## 2022-06-28 DIAGNOSIS — F43.22 ADJUSTMENT DISORDER WITH ANXIOUS MOOD: ICD-10-CM

## 2022-06-28 DIAGNOSIS — I10 ESSENTIAL HYPERTENSION: ICD-10-CM

## 2022-06-30 RX ORDER — LISINOPRIL 20 MG/1
TABLET ORAL
Qty: 90 TABLET | Refills: 0 | Status: SHIPPED | OUTPATIENT
Start: 2022-06-30 | End: 2022-10-10

## 2022-06-30 NOTE — TELEPHONE ENCOUNTER
Routing request to provider for review/approval because:  Labs not current:  Creatinine, potassium    Visit is up to date. RN will issue one time 90 day sadie refill. Please advise on labs.   Michele PRYOR RN

## 2022-07-18 ENCOUNTER — DOCUMENTATION ONLY (OUTPATIENT)
Dept: LAB | Facility: CLINIC | Age: 37
End: 2022-07-18

## 2022-07-18 DIAGNOSIS — Z11.59 NEED FOR HEPATITIS C SCREENING TEST: ICD-10-CM

## 2022-07-18 DIAGNOSIS — I10 ESSENTIAL HYPERTENSION: Primary | ICD-10-CM

## 2022-07-18 DIAGNOSIS — E78.5 HYPERLIPIDEMIA LDL GOAL <100: ICD-10-CM

## 2022-07-18 DIAGNOSIS — Z11.4 SCREENING FOR HIV (HUMAN IMMUNODEFICIENCY VIRUS): ICD-10-CM

## 2022-07-18 NOTE — PROGRESS NOTES
This patient is requesting lab tests. He has an appointment with lab on July 27th. If you do not have orders please forward this to a TC to call the patient, thanks Charlotte De Jesus

## 2022-10-06 DIAGNOSIS — I10 ESSENTIAL HYPERTENSION: ICD-10-CM

## 2022-10-06 DIAGNOSIS — F43.22 ADJUSTMENT DISORDER WITH ANXIOUS MOOD: ICD-10-CM

## 2022-10-06 NOTE — LETTER
October 18, 2022      Nikolas Jiangmartínezcrissamuel  [address] [address] [address]        Michael Mirandaome,     We recently received a call from your pharmacy requesting a refill of your medication lisinopril. We are contacting you today to notify you that you are due for a office visit  for further refills.     We have authorized a one time refill of your medication to allow time for you to schedule an appointment.     Please call [phone] to schedule an appointment or if you have MyChart you can schedule with your provider as well.     Taking care of your health is important to us, and ongoing visits with your provider are vital to your care. We look forward to seeing you in the near future.     Thank you for using [facility] for your medical needs.             Sincerely,        John Hayes PA-C

## 2022-10-07 NOTE — TELEPHONE ENCOUNTER
Routing refill request to provider for review/approval because:  Labs not current:  Creatinine, Potassium  Creatinine   Date Value Ref Range Status   03/01/2021 0.76 0.66 - 1.25 mg/dL Final     Potassium   Date Value Ref Range Status   03/01/2021 4.2 3.4 - 5.3 mmol/L Final     Claudette Augustin RN, BSN, PHN  [facility]

## 2022-10-10 RX ORDER — LISINOPRIL 20 MG/1
TABLET ORAL
Qty: 90 TABLET | Refills: 0 | Status: SHIPPED | OUTPATIENT
Start: 2022-10-10 | End: 2023-01-20

## 2023-01-20 DIAGNOSIS — F43.22 ADJUSTMENT DISORDER WITH ANXIOUS MOOD: ICD-10-CM

## 2023-01-20 DIAGNOSIS — I10 ESSENTIAL HYPERTENSION: ICD-10-CM

## 2023-01-20 RX ORDER — LISINOPRIL 20 MG/1
TABLET ORAL
Qty: 30 TABLET | Refills: 0 | Status: SHIPPED | OUTPATIENT
Start: 2023-01-20 | End: 2024-09-05

## 2023-01-20 NOTE — TELEPHONE ENCOUNTER
Routing refill request to provider for review/approval because:  Donna given x1 and patient did not follow up, please advise  Labs not current:  BMP  Patient needs to be seen because it has been more than 1 year since last office visit.  Failing bp    Kathy Castellano RN

## 2024-01-01 NOTE — PROGRESS NOTES
"[facility] [place]  MATERNAL CHILD HEALTH   CARE CONFERENCE    DATA:     Cristobal BATEMAN \"Male-Bea\" was born 2022 at Gestational Age: 23w1d and is now corrected to 26w2d.    Cristobal continues to be hospitalized for:   Problem List as of 2024 Never Reviewed      * (Principal) Prematurity    Slow feeding in     Respiratory failure of  (H28)    Need for observation and evaluation of  for sepsis    Hyperglycemia    Necrotizing enterocolitis (H24)    Patent ductus arteriosus    Hyponatremia    Adrenal insufficiency (H24)    Thrombocytopenia (H24)      A small baby care conference was held on 2024. In attendance were:    Family: Bea Sommer Familia (mother)  MD: Dr. Rosemary Justin   via Language [employer] (in-person unavailable due to parent's late arrival)  : Kalley Thurner    TEAM INTERVENTION:     Care conference was scheduled today for 3:00pm.  Parents had not arrived by 3:10pm.  SW spoke with Cristobal who indicated they were 20 minutes away.  Bea arrived at 3:45pm.  Cristobal stayed in the car to watch their other children.  Medical team met with parents to review current situation and to talk about proposed plan for moving forward. Of particular note:  Discussed growth chart  Discussed NEC and feeding course  Discussed PDA  Discussed breathing, heart, head, eyes, condition of skin, pain/sedation, bilirubin, blood transfusion  Discussed Q-rounds  Answered family questions regarding when she would be able to hold Cristobal for the first time. Hopeful it could happen today.  SW provided validation of emotion, encouraged family to continue accessing their network and SW for support.    ASSESSMENT:     Coping: Cristobal has previously shared with SW that because he is the man caring for the family, he has to stay strong.  He acknowledges that the NICU has been overwhelming.  Bea presents as quiet but shared with a smile that things are going " [facility]   Subjective:  Nikolas Yo is here for his first postoperative visit.  He underwent Umbilical hernia repair with Bard Ventralex ST Hernia Patch by Dr. Garrison.  He is now over 3 weeks postop, and feels his recovery has been progressing nicely.  Rx/OTC pain medication has been used appropriately for pain control.  Postop recovery complications: None.    Today he has minimal discomfort at the repair site with activities, tolerating a regular diet, and having normal bowel activity.  Current pain management: none.  His normal work is a  position involving moderate activity.  He returned to work a week after surgery at limited hours and abiding by restrictions; felt he overdid it a bit initially but now is tolerating well.  Following restrictions outlined by surgeon.    Objective:  Abd - soft, non-tender, non-distended  Inc - skin glue removed, healing well, no erythema/bruising, +normal healing ridge, no seroma/hematoma noted, no hernia noted    Assessment:  S/p Umbilical hernia repair with Bard Ventralex ST Hernia Patch; unremarkable recovery.    Plan:  Nikolas may continue to slowly advance his activities at this time.  General recommendation is to remain at a 20 lb weight restriction until 3 weeks after surgery.  After that time, he may increase activity as tolerated.  He may continue to utilize OTC pain management options as well as use of ice/heat to site for comfort.  He should expect progressive resolution of the healing ridge along the incisional site over the following 2-3 months.      Nikolas is recommended to contact the office if worsening pain, onset of fever/redness at inc site, or new drainage from the area.  Pt also recommended to call office at any time if ongoing questions/concerns during recovery, but otherwise may follow-up on a prn basis.  Pt is in agreement with this plan.      Randa Chung PA-C      Please route or send letter to:  Primary Care Provider  "okay.  Both parents were tearful and emotional when baby Cristobal was diagnosed with NEC.    Strengths:  Family reports they are doing their best to navigate this lengthy NICU admission while tending to their other children at home.    Vulnerabilities/Barriers:  Parents are navigating a lengthy NICU admission for their son born at 23w1d.  They are currently working with child protection services due to Cristobal's positive toxicology screen for methamphetamine and amphetamines at birth.  Parents have 3 additional young children at home that they are caring for at this time.    PLAN:     SW will continue to follow for supportive intervention.    Kalley Thurner, JEB, SW  Maternal and Child Health   Reachable via [employer] messenger & call  Office: [phone]  [email or web address]    After hours social work can be reached via [employer] @ \"Peds SW After Hours On Call 1620 to 08\"  Weekend on-site social work can be reached via [employer] @ \"Peds SW Weekend Onsite 08 to 1630\"      " (PCP)

## 2024-09-05 ENCOUNTER — OFFICE VISIT (OUTPATIENT)
Dept: FAMILY MEDICINE | Facility: CLINIC | Age: 39
End: 2024-09-05
Payer: COMMERCIAL

## 2024-09-05 VITALS
BODY MASS INDEX: 33.19 KG/M2 | TEMPERATURE: 98.2 F | HEIGHT: 74 IN | WEIGHT: 258.6 LBS | SYSTOLIC BLOOD PRESSURE: 146 MMHG | RESPIRATION RATE: 16 BRPM | HEART RATE: 82 BPM | DIASTOLIC BLOOD PRESSURE: 100 MMHG | OXYGEN SATURATION: 96 %

## 2024-09-05 DIAGNOSIS — Z11.59 NEED FOR HEPATITIS C SCREENING TEST: ICD-10-CM

## 2024-09-05 DIAGNOSIS — E66.811 CLASS 1 OBESITY DUE TO EXCESS CALORIES WITHOUT SERIOUS COMORBIDITY IN ADULT, UNSPECIFIED BMI: ICD-10-CM

## 2024-09-05 DIAGNOSIS — Z00.00 ROUTINE GENERAL MEDICAL EXAMINATION AT A HEALTH CARE FACILITY: Primary | ICD-10-CM

## 2024-09-05 DIAGNOSIS — Z83.6 FAMILY HISTORY OF PULMONARY FIBROSIS: ICD-10-CM

## 2024-09-05 DIAGNOSIS — E66.09 CLASS 1 OBESITY DUE TO EXCESS CALORIES WITHOUT SERIOUS COMORBIDITY IN ADULT, UNSPECIFIED BMI: ICD-10-CM

## 2024-09-05 DIAGNOSIS — I10 ESSENTIAL HYPERTENSION: ICD-10-CM

## 2024-09-05 DIAGNOSIS — Z11.4 SCREENING FOR HIV (HUMAN IMMUNODEFICIENCY VIRUS): ICD-10-CM

## 2024-09-05 DIAGNOSIS — F43.22 ADJUSTMENT DISORDER WITH ANXIOUS MOOD: ICD-10-CM

## 2024-09-05 LAB
ALBUMIN SERPL BCG-MCNC: 4.6 G/DL (ref 3.5–5.2)
ALP SERPL-CCNC: 91 U/L (ref 40–150)
ALT SERPL W P-5'-P-CCNC: 36 U/L (ref 0–70)
ANION GAP SERPL CALCULATED.3IONS-SCNC: 12 MMOL/L (ref 7–15)
AST SERPL W P-5'-P-CCNC: 26 U/L (ref 0–45)
BILIRUB SERPL-MCNC: 0.3 MG/DL
BUN SERPL-MCNC: 13.4 MG/DL (ref 6–20)
CALCIUM SERPL-MCNC: 9.7 MG/DL (ref 8.8–10.4)
CHLORIDE SERPL-SCNC: 104 MMOL/L (ref 98–107)
CHOLEST SERPL-MCNC: 221 MG/DL
CREAT SERPL-MCNC: 0.9 MG/DL (ref 0.67–1.17)
EGFRCR SERPLBLD CKD-EPI 2021: >90 ML/MIN/1.73M2
FASTING STATUS PATIENT QL REPORTED: NO
FASTING STATUS PATIENT QL REPORTED: NO
GLUCOSE SERPL-MCNC: 92 MG/DL (ref 70–99)
HCO3 SERPL-SCNC: 25 MMOL/L (ref 22–29)
HCV AB SERPL QL IA: NONREACTIVE
HDLC SERPL-MCNC: 41 MG/DL
HIV 1+2 AB+HIV1 P24 AG SERPL QL IA: NONREACTIVE
LDLC SERPL CALC-MCNC: 144 MG/DL
NONHDLC SERPL-MCNC: 180 MG/DL
POTASSIUM SERPL-SCNC: 4.5 MMOL/L (ref 3.4–5.3)
PROT SERPL-MCNC: 7.3 G/DL (ref 6.4–8.3)
SODIUM SERPL-SCNC: 141 MMOL/L (ref 135–145)
TRIGL SERPL-MCNC: 182 MG/DL

## 2024-09-05 PROCEDURE — 90471 IMMUNIZATION ADMIN: CPT | Performed by: PHYSICIAN ASSISTANT

## 2024-09-05 PROCEDURE — 99214 OFFICE O/P EST MOD 30 MIN: CPT | Mod: 25 | Performed by: PHYSICIAN ASSISTANT

## 2024-09-05 PROCEDURE — 99395 PREV VISIT EST AGE 18-39: CPT | Mod: 25 | Performed by: PHYSICIAN ASSISTANT

## 2024-09-05 PROCEDURE — 87389 HIV-1 AG W/HIV-1&-2 AB AG IA: CPT | Performed by: PHYSICIAN ASSISTANT

## 2024-09-05 PROCEDURE — 80053 COMPREHEN METABOLIC PANEL: CPT | Performed by: PHYSICIAN ASSISTANT

## 2024-09-05 PROCEDURE — 36415 COLL VENOUS BLD VENIPUNCTURE: CPT | Performed by: PHYSICIAN ASSISTANT

## 2024-09-05 PROCEDURE — 90746 HEPB VACCINE 3 DOSE ADULT IM: CPT | Performed by: PHYSICIAN ASSISTANT

## 2024-09-05 PROCEDURE — 86803 HEPATITIS C AB TEST: CPT | Performed by: PHYSICIAN ASSISTANT

## 2024-09-05 PROCEDURE — 90677 PCV20 VACCINE IM: CPT | Performed by: PHYSICIAN ASSISTANT

## 2024-09-05 PROCEDURE — 80061 LIPID PANEL: CPT | Performed by: PHYSICIAN ASSISTANT

## 2024-09-05 PROCEDURE — 90472 IMMUNIZATION ADMIN EACH ADD: CPT | Performed by: PHYSICIAN ASSISTANT

## 2024-09-05 RX ORDER — LISINOPRIL 20 MG/1
20 TABLET ORAL DAILY
Qty: 90 TABLET | Refills: 0 | Status: SHIPPED | OUTPATIENT
Start: 2024-09-05

## 2024-09-05 ASSESSMENT — PAIN SCALES - GENERAL: PAINLEVEL: NO PAIN (0)

## 2024-09-05 NOTE — LETTER
September 6, 2024      Nikolas Gonzalezsamuel  [address]  [place] [address]        Nikolas - great seeing you this week.   Labs overall showed some ok and some to improve. The cholesterol remains elevated but thankfully better than the past few years - I suspect from the improved diet you mentioned. Once you are able to add some exercise and a few more dietary tweaks I think you'll continue to see this drop. The rest of the labs were great. Let me know your questions,   Luke Salazar Orders   HIV Antigen Antibody Combo   Result Value Ref Range    HIV Antigen Antibody Combo Nonreactive Nonreactive      Comment:      Negative HIV-1 p24 antigen and HIV-1/2 antibody screening test results usually indicate the absence of HIV-1 and HIV-2 infection. However, such negative results do not rule-out acute HIV infection.  If acute HIV-1 or HIV-2 infection is suspected, detection of HIV-1 or HIV-2 RNA  is recommended.    Hepatitis C Screen Reflex to HCV RNA Quant and Genotype   Result Value Ref Range    Hepatitis C Antibody Nonreactive Nonreactive      Comment:      A nonreactive screening test result does not exclude the possibility of exposure to or infection with HCV. Nonreactive screening test results in individuals with prior exposure to HCV may be due to antibody levels below the limit of detection of this assay or lack of reactivity to the HCV antigens used in this assay. Patients with recent HCV infections (<3 months from time of exposure) may have false-negative HCV antibody results due to the time needed for seroconversion (average of 8 to 9 weeks).   Comprehensive metabolic panel (BMP + Alb, Alk Phos, ALT, AST, Total. Bili, TP)   Result Value Ref Range    Sodium 141 135 - 145 mmol/L    Potassium 4.5 3.4 - 5.3 mmol/L    Carbon Dioxide (CO2) 25 22 - 29 mmol/L    Anion Gap 12 7 - 15 mmol/L    Urea Nitrogen 13.4 6.0 - 20.0 mg/dL    Creatinine 0.90 0.67 - 1.17 mg/dL    GFR Estimate >90 >60 mL/min/1.73m2       Comment:      eGFR calculated using 2021 CKD-EPI equation.    Calcium 9.7 8.8 - 10.4 mg/dL      Comment:      Reference intervals for this test were updated on 7/16/2024 to reflect our healthy population more accurately. There may be differences in the flagging of prior results with similar values performed with this method. Those prior results can be interpreted in the context of the updated reference intervals.    Chloride 104 98 - 107 mmol/L    Glucose 92 70 - 99 mg/dL    Alkaline Phosphatase 91 40 - 150 U/L    AST 26 0 - 45 U/L    ALT 36 0 - 70 U/L    Protein Total 7.3 6.4 - 8.3 g/dL    Albumin 4.6 3.5 - 5.2 g/dL    Bilirubin Total 0.3 <=1.2 mg/dL    Patient Fasting > 8hrs? No    Lipid panel reflex to direct LDL Non-fasting   Result Value Ref Range    Cholesterol 221 (H) <200 mg/dL    Triglycerides 182 (H) <150 mg/dL    Direct Measure HDL 41 >=40 mg/dL    LDL Cholesterol Calculated 144 (H) <=100 mg/dL    Non HDL Cholesterol 180 (H) <130 mg/dL    Patient Fasting > 8hrs? No     Narrative    Cholesterol  Desirable:  <200 mg/dL    Triglycerides  Normal:  Less than 150 mg/dL  Borderline High:  150-199 mg/dL  High:  200-499 mg/dL  Very High:  Greater than or equal to 500 mg/dL    Direct Measure HDL  Female:  Greater than or equal to 50 mg/dL   Male:  Greater than or equal to 40 mg/dL    LDL Cholesterol  Desirable:  <100mg/dL  Above Desirable:  100-129 mg/dL   Borderline High:  130-159 mg/dL   High:  160-189 mg/dL   Very High:  >= 190 mg/dL    Non HDL Cholesterol  Desirable:  130 mg/dL  Above Desirable:  130-159 mg/dL  Borderline High:  160-189 mg/dL  High:  190-219 mg/dL  Very High:  Greater than or equal to 220 mg/dL

## 2024-09-05 NOTE — PROGRESS NOTES
"Preventive Care Visit  [facility] [place] Hayes PA-C, Family Medicine  Sep 5, 2024      Assessment & Plan     Routine general medical examination at a health care facility  Reviewed personal and family history. Reviewed age appropriate screenings. Recommended any needed vaccinations.    Essential hypertension  Restart. Follow up 4-6 weeks  - lisinopril (ZESTRIL) 20 MG tablet; Take 1 tablet (20 mg) by mouth daily.    Adjustment disorder with anxious mood  Restart. Follow up 4-6 weeks  - sertraline (ZOLOFT) 50 MG tablet; Take 1 tablet (50 mg) by mouth daily.    Family history of pulmonary fibrosis  He can consider genetic testing. Strongly recommend smoking cessation    Screening for HIV (human immunodeficiency virus)  Per CDC  - HIV Antigen Antibody Combo; Future  - HIV Antigen Antibody Combo    Need for hepatitis C screening test  Per CDC  - Hepatitis C Screen Reflex to HCV RNA Quant and Genotype; Future  - Hepatitis C Screen Reflex to HCV RNA Quant and Genotype    Class 1 obesity due to excess calories without serious comorbidity in adult, unspecified BMI  Lifestyle recommendations  - Comprehensive metabolic panel (BMP + Alb, Alk Phos, ALT, AST, Total. Bili, TP); Future  - Lipid panel reflex to direct LDL Non-fasting; Future  - Comprehensive metabolic panel (BMP + Alb, Alk Phos, ALT, AST, Total. Bili, TP)  - Lipid panel reflex to direct LDL Non-fasting          Nicotine/Tobacco Cessation  He reports that he has been smoking other and cigarettes. He has quit using smokeless tobacco.        BMI  Estimated body mass index is 33.2 kg/m  as calculated from the following:    Height as of this encounter: 1.88 m (6' 2\").    Weight as of this encounter: 117.3 kg (258 lb 9.6 oz).       Counseling  Appropriate preventive services were addressed with this patient via screening, questionnaire, or discussion as appropriate for fall prevention, nutrition, physical activity, Tobacco-use cessation, " social engagement, weight loss and cognition.  Checklist reviewing preventive services available has been given to the patient.  Reviewed patient's diet, addressing concerns and/or questions.   He is at risk for lack of exercise and has been provided with information to increase physical activity for the benefit of his well-being.   The patient was instructed to see the dentist every 6 months.   The patient reports drinking more than 3 alcoholic drinks per day and/or more than 7 drhnks per week. The patient was counseled and given information about possible harmful effects of excessive alcohol intake.      Maged Connor is a 39 year old, presenting for the following:  Physical        9/5/2024     1:17 PM   Additional Questions   Roomed by Ariana RICHARD CMA   Accompanied by NAZANIN         9/5/2024     1:17 PM   Patient Reported Additional Medications   Patient reports taking the following new medications None        Health Care Directive  Patient does not have a Health Care Directive or Living Will: Discussed advance care planning with patient; however, patient declined at this time.    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 Good   Feel stress (tense, anxious, or unable to sleep) Rather much      (!) STRESS CONCERN      9/5/2024   Nutrition   Three or more servings of calcium each day? Yes   Diet: I don't know   How many servings of fruit and vegetables per day? (!) 0-1   How many sweetened  beverages each day? 0-1            9/5/2024   Exercise   Days per week of moderate/strenous exercise 2 days      (!) EXERCISE CONCERN      9/5/2024   Social Factors   Frequency of gathering with friends or relatives Patient declined   Worry food won't last until get money to buy more No   Food not last or not have enough money for food? No   Do you have housing? (Housing is defined as stable permanent housing and does not include staying ouside in a car, in a tent, in an abandoned building, in an overnight shelter, or couch-surfing.) Yes   Are you worried about losing your housing? No   Lack of transportation? No   Unable to get utilities (heat,electricity)? No            9/5/2024   Dental   Dentist two times every year? (!) NO            9/5/2024   TB Screening   Were you born outside of the [place]? No            Today's PHQ-2 Score:       9/5/2024     1:09 PM   PHQ-2 ( 1999 [employer])   Q1: Little interest or pleasure in doing things 0   Q2: Feeling down, depressed or hopeless 1   PHQ-2 Score 1   Q1: Little interest or pleasure in doing things Not at all   Q2: Feeling down, depressed or hopeless Several days   PHQ-2 Score 1           9/5/2024   Substance Use   Alcohol more than 3/day or more than 7/wk Yes   How often do you have a drink containing alcohol 2 to 4 times a month   How many alcohol drinks on typical day 3 or 4   How often do you have 5+ drinks at one occasion Monthly   Audit 2/3 Score 3   How often not able to stop drinking once started Never   How often failed to do what normally expected Never   How often needed first drink in am after a heavy drinking session Never   How often feeling of guilt or remorse after drinking Never   How often unable to remember what happened the night before Never   Have you or someone else been injured because of your drinking No   Has anyone been concerned or suggested you cut down on drinking No   TOTAL SCORE - AUDIT 5   Do you use any other substances recreationally? (!)  "[employer]        Social History     Tobacco Use    Smoking status: Every Day     Current packs/day: 0.50     Types: Other, Cigarettes    Smokeless tobacco: Former   Vaping Use    Vaping status: Never Used   Substance Use Topics    Alcohol use: Yes     Comment: Socially     Drug use: Yes     Types: Marijuana     Comment: occassionally             9/5/2024   One time HIV Screening   Previous HIV test? Yes          9/5/2024   STI Screening   New sexual partner(s) since last STI/HIV test? No            9/5/2024   Contraception/Family Planning   Questions about contraception or family planning No           Reviewed and updated as needed this visit by Provider                    Lab work is in process  Labs reviewed in EPIC      Review of Systems  Constitutional, HEENT, cardiovascular, pulmonary, gi and gu systems are negative, except as otherwise noted.     Objective    Exam  BP (!) 143/98 (BP Location: Right arm, Patient Position: Sitting, Cuff Size: Adult Large)   Pulse 82   Temp 98.2  F (36.8  C) (Oral)   Resp 16   Ht 1.88 m (6' 2\")   Wt 117.3 kg (258 lb 9.6 oz)   SpO2 96%   BMI 33.20 kg/m     Estimated body mass index is 33.2 kg/m  as calculated from the following:    Height as of this encounter: 1.88 m (6' 2\").    Weight as of this encounter: 117.3 kg (258 lb 9.6 oz).    Physical Exam  GENERAL: alert and no distress  EYES: Eyes grossly normal to inspection, PERRL and conjunctivae and sclerae normal  HENT: ear canals and TM's normal, nose and mouth without ulcers or lesions  NECK: no adenopathy, no asymmetry, masses, or scars  RESP: lungs clear to auscultation - no rales, rhonchi or wheezes  CV: regular rate and rhythm, normal S1 S2, no S3 or S4, no murmur, click or rub, no peripheral edema  ABDOMEN: soft, nontender, no hepatosplenomegaly, no masses and bowel sounds normal  MS: no gross musculoskeletal defects noted, no edema  SKIN: no suspicious lesions or rashes  PSYCH: mentation appears normal, " affect normal/bright        Signed Electronically by: John Hayes PA-C

## 2024-09-05 NOTE — PROGRESS NOTES
Prior to immunization administration, verified patients identity using patient s name and date of birth. Please see Immunization Activity for additional information.     Screening Questionnaire for Adult Immunization    Are you sick today?   No   Do you have allergies to medications, food, a vaccine component or latex?   No   Have you ever had a serious reaction after receiving a vaccination?   No   Do you have a long-term health problem with heart, lung, kidney, or metabolic disease (e.g., diabetes), asthma, a blood disorder, no spleen, complement component deficiency, a cochlear implant, or a spinal fluid leak?  Are you on long-term aspirin therapy?   No   Do you have cancer, leukemia, HIV/AIDS, or any other immune system problem?   No   Do you have a parent, brother, or sister with an immune system problem?   No   In the past 3 months, have you taken medications that affect  your immune system, such as prednisone, other steroids, or anticancer drugs; drugs for the treatment of rheumatoid arthritis, Crohn s disease, or psoriasis; or have you had radiation treatments?   No   Have you had a seizure, or a brain or other nervous system problem?   No   During the past year, have you received a transfusion of blood or blood    products, or been given immune (gamma) globulin or antiviral drug?   No   For women: Are you pregnant or is there a chance you could become       pregnant during the next month?   No   Have you received any vaccinations in the past 4 weeks?   No     Immunization questionnaire answers were all negative.      Patient instructed to remain in clinic for 15 minutes afterwards, and to report any adverse reactions.     Screening performed by Naomi Head on 9/5/2024 at 2:14 PM.

## 2024-09-05 NOTE — PATIENT INSTRUCTIONS
Patient Education   Preventive Care Advice   This is general advice given by our system to help you stay healthy. However, your care team may have specific advice just for you. Please talk to your care team about your preventive care needs.  Nutrition  Eat 5 or more servings of fruits and vegetables each day.  Try wheat bread, brown rice and whole grain pasta (instead of white bread, rice, and pasta).  Get enough calcium and vitamin D. Check the label on foods and aim for 100% of the RDA (recommended daily allowance).  Lifestyle  Exercise at least 150 minutes each week  (30 minutes a day, 5 days a week).  Do muscle strengthening activities 2 days a week. These help control your weight and prevent disease.  No smoking.  Wear sunscreen to prevent skin cancer.  Have a dental exam and cleaning every 6 months.  Yearly exams  See your health care team every year to talk about:  Any changes in your health.  Any medicines your care team has prescribed.  Preventive care, family planning, and ways to prevent chronic diseases.  Shots (vaccines)   HPV shots (up to age 26), if you've never had them before.  Hepatitis B shots (up to age 59), if you've never had them before.  COVID-19 shot: Get this shot when it's due.  Flu shot: Get a flu shot every year.  Tetanus shot: Get a tetanus shot every 10 years.  Pneumococcal, hepatitis A, and RSV shots: Ask your care team if you need these based on your risk.  Shingles shot (for age 50 and up)  General health tests  Diabetes screening:  Starting at age 35, Get screened for diabetes at least every 3 years.  If you are younger than age 35, ask your care team if you should be screened for diabetes.  Cholesterol test: At age 39, start having a cholesterol test every 5 years, or more often if advised.  Bone density scan (DEXA): At age 50, ask your care team if you should have this scan for osteoporosis (brittle bones).  Hepatitis C: Get tested at least once in your life.  STIs (sexually  transmitted infections)  Before age 24: Ask your care team if you should be screened for STIs.  After age 24: Get screened for STIs if you're at risk. You are at risk for STIs (including HIV) if:  You are sexually active with more than one person.  You don't use condoms every time.  You or a partner was diagnosed with a sexually transmitted infection.  If you are at risk for HIV, ask about PrEP medicine to prevent HIV.  Get tested for HIV at least once in your life, whether you are at risk for HIV or not.  Cancer screening tests  Cervical cancer screening: If you have a cervix, begin getting regular cervical cancer screening tests starting at age 21.  Breast cancer scan (mammogram): If you've ever had breasts, begin having regular mammograms starting at age 40. This is a scan to check for breast cancer.  Colon cancer screening: It is important to start screening for colon cancer at age 45.  Have a colonoscopy test every 10 years (or more often if you're at risk) Or, ask your provider about stool tests like a FIT test every year or Cologuard test every 3 years.  To learn more about your testing options, visit:   .  For help making a decision, visit:   https://bit.ly/sg92737.  Prostate cancer screening test: If you have a prostate, ask your care team if a prostate cancer screening test (PSA) at age 55 is right for you.  Lung cancer screening: If you are a current or former smoker ages 50 to 80, ask your care team if ongoing lung cancer screenings are right for you.  For informational purposes only. Not to replace the advice of your health care provider. Copyright   2023 [employer]. All rights reserved. Clinically reviewed by the [facility]. [employer] 984660 - REV 01/24.      Lab: [id number] Lab: [id number]

## 2024-10-15 ENCOUNTER — OFFICE VISIT (OUTPATIENT)
Dept: FAMILY MEDICINE | Facility: CLINIC | Age: 39
End: 2024-10-15
Payer: COMMERCIAL

## 2024-10-15 VITALS
BODY MASS INDEX: 32.25 KG/M2 | DIASTOLIC BLOOD PRESSURE: 96 MMHG | WEIGHT: 251.3 LBS | TEMPERATURE: 97.9 F | RESPIRATION RATE: 16 BRPM | HEIGHT: 74 IN | SYSTOLIC BLOOD PRESSURE: 152 MMHG | OXYGEN SATURATION: 97 % | HEART RATE: 99 BPM

## 2024-10-15 DIAGNOSIS — F43.22 ADJUSTMENT DISORDER WITH ANXIOUS MOOD: Primary | ICD-10-CM

## 2024-10-15 DIAGNOSIS — I10 ESSENTIAL HYPERTENSION: ICD-10-CM

## 2024-10-15 PROCEDURE — 90746 HEPB VACCINE 3 DOSE ADULT IM: CPT | Performed by: PHYSICIAN ASSISTANT

## 2024-10-15 PROCEDURE — 90471 IMMUNIZATION ADMIN: CPT | Performed by: PHYSICIAN ASSISTANT

## 2024-10-15 PROCEDURE — 99214 OFFICE O/P EST MOD 30 MIN: CPT | Mod: 25 | Performed by: PHYSICIAN ASSISTANT

## 2024-10-15 RX ORDER — LISINOPRIL 20 MG/1
40 TABLET ORAL DAILY
Status: SHIPPED
Start: 2024-10-15

## 2024-10-15 ASSESSMENT — ANXIETY QUESTIONNAIRES
GAD7 TOTAL SCORE: 3
1. FEELING NERVOUS, ANXIOUS, OR ON EDGE: SEVERAL DAYS
4. TROUBLE RELAXING: NOT AT ALL
GAD7 TOTAL SCORE: 3
IF YOU CHECKED OFF ANY PROBLEMS ON THIS QUESTIONNAIRE, HOW DIFFICULT HAVE THESE PROBLEMS MADE IT FOR YOU TO DO YOUR WORK, TAKE CARE OF THINGS AT HOME, OR GET ALONG WITH OTHER PEOPLE: NOT DIFFICULT AT ALL
8. IF YOU CHECKED OFF ANY PROBLEMS, HOW DIFFICULT HAVE THESE MADE IT FOR YOU TO DO YOUR WORK, TAKE CARE OF THINGS AT HOME, OR GET ALONG WITH OTHER PEOPLE?: NOT DIFFICULT AT ALL
2. NOT BEING ABLE TO STOP OR CONTROL WORRYING: SEVERAL DAYS
7. FEELING AFRAID AS IF SOMETHING AWFUL MIGHT HAPPEN: SEVERAL DAYS
6. BECOMING EASILY ANNOYED OR IRRITABLE: NOT AT ALL
3. WORRYING TOO MUCH ABOUT DIFFERENT THINGS: NOT AT ALL
5. BEING SO RESTLESS THAT IT IS HARD TO SIT STILL: NOT AT ALL

## 2024-10-15 ASSESSMENT — PAIN SCALES - GENERAL: PAINLEVEL: NO PAIN (0)

## 2024-10-15 NOTE — PROGRESS NOTES
"  Assessment & Plan     Adjustment disorder with anxious mood  Definitive improvement back on the medication.  He is ready to move back up to his prior dose of 75mg. Sending refill  - sertraline (ZOLOFT) 50 MG tablet; Take 1.5 tablets (75 mg) by mouth daily.    Essential hypertension  Uncontrolled despite addition of lisinopril 20mg which was his former dose. He did work to drop portions and has cut back some weight since last seen. We'll double his rx to 40mg and have him follow up with a nurse visit next week to see about control. If not effective we'll need to add diuretic  - lisinopril (ZESTRIL) 20 MG tablet; Take 2 tablets (40 mg) by mouth daily.          BMI  Estimated body mass index is 32.27 kg/m  as calculated from the following:    Height as of this encounter: 1.88 m (6' 2\").    Weight as of this encounter: 114 kg (251 lb 4.8 oz).           Maged Connor is a 39 year old, presenting for the following health issues:  Depression, Anxiety, and Hypertension        10/15/2024     2:11 PM   Additional Questions   Roomed by Ariana RICHARD CMA   Accompanied by NAZANIN         10/15/2024     2:11 PM   Patient Reported Additional Medications   Patient reports taking the following new medications None     History of Present Illness       Mental Health Follow-up:  Patient presents to follow-up on Anxiety.    Patient's anxiety since last visit has been:  Better  The patient is not having other symptoms associated with anxiety.  Any significant life events: No  Patient is not feeling anxious or having panic attacks.  Patient has no concerns about alcohol or drug use.    Hypertension: He presents for follow up of hypertension.  He does not check blood pressure  regularly outside of the clinic. Outpatient blood pressures have not been over 140/90. He does not follow a low salt diet.     He eats 0-1 servings of fruits and vegetables daily.He consumes 0 sweetened beverage(s) daily.He exercises with enough effort to increase his " "heart rate 10 to 19 minutes per day.  He exercises with enough effort to increase his heart rate 3 or less days per week.   He is taking medications regularly.       Nikolas Yo is a 39 year old male who presents today for medication check  He has tolerated restarting sertraline and noting an improvement  Managing work stress a lot better   Denies any emotional blunting  Ready to move to 75mg    Believes overall he tolerated lisinopril  Taking in the morning  Did have a stressful day at work and so not sure if that's contributing to high today  Sleep is good  Denies any chest pains, shortness of breath         Review of Systems  Constitutional, HEENT, cardiovascular, pulmonary, gi and gu systems are negative, except as otherwise noted.      Objective    BP (!) 152/96   Pulse 99   Temp 97.9  F (36.6  C) (Oral)   Resp 16   Ht 1.88 m (6' 2\")   Wt 114 kg (251 lb 4.8 oz)   SpO2 97%   BMI 32.27 kg/m    Body mass index is 32.27 kg/m .  Physical Exam   GENERAL: alert and no distress  EYES: Eyes grossly normal to inspection, PERRL and conjunctivae and sclerae normal  RESP: lungs clear to auscultation - no rales, rhonchi or wheezes  CV: regular rate and rhythm, normal S1 S2, no S3 or S4, no murmur, click or rub, no peripheral edema  PSYCH: mentation appears normal, affect normal/bright          Signed Electronically by: John Hayes PA-C    "

## 2024-10-30 ENCOUNTER — ALLIED HEALTH/NURSE VISIT (OUTPATIENT)
Dept: FAMILY MEDICINE | Facility: CLINIC | Age: 39
End: 2024-10-30
Payer: COMMERCIAL

## 2024-10-30 VITALS — DIASTOLIC BLOOD PRESSURE: 87 MMHG | SYSTOLIC BLOOD PRESSURE: 128 MMHG

## 2024-10-30 DIAGNOSIS — Z01.30 BP CHECK: Primary | ICD-10-CM

## 2024-10-30 PROCEDURE — 99207 PR NO CHARGE NURSE ONLY: CPT

## 2024-10-30 NOTE — PROGRESS NOTES
iNkolas Yo is a 39 year old patient who comes in today for a Blood Pressure check.  Initial BP:  /87 (BP Location: Right arm, Patient Position: Chair, Cuff Size: Adult Large)        Disposition: follow-up as previously indicated by provider    Radha Servin

## 2024-11-14 DIAGNOSIS — I10 ESSENTIAL HYPERTENSION: ICD-10-CM

## 2024-11-14 RX ORDER — LISINOPRIL 40 MG/1
40 TABLET ORAL DAILY
Qty: 90 TABLET | Refills: 1 | Status: SHIPPED | OUTPATIENT
Start: 2024-11-14

## 2024-11-14 NOTE — TELEPHONE ENCOUNTER
Refill has been placed within this encounter.     Ynes Arevalo   [facility]

## 2024-11-14 NOTE — TELEPHONE ENCOUNTER
Medication Question or Refill    Contacts       Contact Date/Time Type Contact Phone/Fax    11/14/2024 07:10 AM CST Phone (Incoming) Nikolas Yo (Self) [phone] (M)            What medication are you calling about (include dose and sig)?:   lisinopril (ZESTRIL) 20 MG tablet      Preferred Pharmacy:  [facility] [address] [facility]  [address] [facility] [address]  Phone: [phone] Fax: [phone]      Controlled Substance Agreement on file:   CSA -- Patient Level:    CSA: None found at the patient level.       Who prescribed the medication?: pcp    Do you need a refill? Yes    When did you use the medication last? 11/12/2024    Patient offered an appointment? No    Do you have any questions or concerns?  No      Okay to leave a detailed message?: Yes at Home number on file [phone] (home)

## 2025-05-19 ENCOUNTER — PATIENT OUTREACH (OUTPATIENT)
Dept: CARE COORDINATION | Facility: CLINIC | Age: 40
End: 2025-05-19
Payer: COMMERCIAL

## 2025-08-11 ENCOUNTER — PATIENT OUTREACH (OUTPATIENT)
Dept: CARE COORDINATION | Facility: CLINIC | Age: 40
End: 2025-08-11
Payer: COMMERCIAL

## 2025-08-25 ENCOUNTER — PATIENT OUTREACH (OUTPATIENT)
Dept: CARE COORDINATION | Facility: CLINIC | Age: 40
End: 2025-08-25
Payer: COMMERCIAL

## (undated) DEVICE — SU VICRYL 3-0 SH 27" UND J416H

## (undated) DEVICE — LINEN HALF SHEET 5512

## (undated) DEVICE — DECANTER VIAL 2006S

## (undated) DEVICE — DRAPE LAP W/ARMBOARD 29410

## (undated) DEVICE — Device

## (undated) DEVICE — GOWN XLG DISP 9545

## (undated) DEVICE — BAG CLEAR TRASH 1.3M 39X33" P4040C

## (undated) DEVICE — GLOVE PROTEXIS BLUE W/NEU-THERA 7.0  2D73EB70

## (undated) DEVICE — SU DERMABOND MINI DHVM12

## (undated) DEVICE — NDL 22GA 1.5"

## (undated) DEVICE — LINEN TOWEL PACK X10 5473

## (undated) DEVICE — LINEN FULL SHEET 5511

## (undated) DEVICE — BLADE CLIPPER 3M 9670

## (undated) DEVICE — PACK MINOR CUSTOM RIDGES SBA32RMRMA

## (undated) DEVICE — SOL NACL 0.9% IRRIG 1000ML BOTTLE 07138-09

## (undated) DEVICE — ESU GROUND PAD ADULT W/CORD E7507

## (undated) DEVICE — SU VICRYL 2-0 TIE 12X18" J905T

## (undated) DEVICE — SU PDS II 0 CT-2 27" Z334H

## (undated) DEVICE — CLEANSER WOUND IRRISEPT 0.05% CHG IRRISEPT-403

## (undated) DEVICE — PREP CHLORAPREP 26ML TINTED ORANGE  260815

## (undated) DEVICE — SYR 10ML FINGER CONTROL W/O NDL 309695

## (undated) DEVICE — GLOVE PROTEXIS POWDER FREE SMT 6.5  2D72PT65X

## (undated) DEVICE — SU VICRYL 4-0 PS-2 18" UND J496H

## (undated) RX ORDER — MEPERIDINE HYDROCHLORIDE 50 MG/ML
INJECTION INTRAMUSCULAR; INTRAVENOUS; SUBCUTANEOUS
Status: DISPENSED
Start: 2018-11-05

## (undated) RX ORDER — FENTANYL CITRATE 50 UG/ML
INJECTION, SOLUTION INTRAMUSCULAR; INTRAVENOUS
Status: DISPENSED
Start: 2018-11-05

## (undated) RX ORDER — PROPOFOL 10 MG/ML
INJECTION, EMULSION INTRAVENOUS
Status: DISPENSED
Start: 2018-11-05

## (undated) RX ORDER — DEXAMETHASONE SODIUM PHOSPHATE 4 MG/ML
INJECTION, SOLUTION INTRA-ARTICULAR; INTRALESIONAL; INTRAMUSCULAR; INTRAVENOUS; SOFT TISSUE
Status: DISPENSED
Start: 2018-11-05

## (undated) RX ORDER — BUPIVACAINE HYDROCHLORIDE 2.5 MG/ML
INJECTION, SOLUTION EPIDURAL; INFILTRATION; INTRACAUDAL
Status: DISPENSED
Start: 2018-11-05

## (undated) RX ORDER — CEFAZOLIN SODIUM IN 0.9 % NACL 3 G/100 ML
INTRAVENOUS SOLUTION, PIGGYBACK (ML) INTRAVENOUS
Status: DISPENSED
Start: 2018-11-05

## (undated) RX ORDER — LIDOCAINE HYDROCHLORIDE 10 MG/ML
INJECTION, SOLUTION EPIDURAL; INFILTRATION; INTRACAUDAL; PERINEURAL
Status: DISPENSED
Start: 2018-11-05

## (undated) RX ORDER — HYDROCODONE BITARTRATE AND ACETAMINOPHEN 5; 325 MG/1; MG/1
TABLET ORAL
Status: DISPENSED
Start: 2018-11-05

## (undated) RX ORDER — ONDANSETRON 2 MG/ML
INJECTION INTRAMUSCULAR; INTRAVENOUS
Status: DISPENSED
Start: 2018-11-05